# Patient Record
Sex: MALE | Race: ASIAN | NOT HISPANIC OR LATINO | Employment: FULL TIME | URBAN - METROPOLITAN AREA
[De-identification: names, ages, dates, MRNs, and addresses within clinical notes are randomized per-mention and may not be internally consistent; named-entity substitution may affect disease eponyms.]

---

## 2018-01-12 NOTE — MISCELLANEOUS
Provider Comments  Provider Comments:   pt was a no show to appt today        Signatures   Electronically signed by : Maeve Singh, ; Nov 21 2016  2:57PM EST                       (Author)

## 2018-01-23 NOTE — PROGRESS NOTES
Assessment   1  Encounter for preventive health examination (V70 0) (Z00 00)  2  Hyperlipidemia (272 4) (E78 5)  3  Prostate cancer screening encounter, options and risks discussed (V76 44) (Z12 5)    Plan  Hyperlipidemia    · (1) COMPREHENSIVE METABOLIC PANEL; Status:Active; Requested for:30Nov2016;    · (1) LIPID PANEL, FASTING; Status:Active; Requested for:30Nov2016;     Discussion/Summary  Impression: health maintenance visit  Currently, he eats a healthy diet and eats an adequate diet  Prostate cancer screening: the risks and benefits of prostate cancer screening were discussed and the patient declines PSA testing  Testicular cancer screening: testicular cancer screening is not indicated  Colorectal cancer screening: colorectal cancer screening is current and the next colonoscopy is due 2019  The immunizations are up to date and per patient report  Advice and education were given regarding aerobic exercise and weight bearing exercise  Patient discussion: discussed with the patient  Chief Complaint  60 yo here for HM exam      History of Present Illness  HM, Adult Male: The patient is being seen for a health maintenance evaluation  The last health maintenance visit was 1 year(s) ago  Social History: He is   Work status: working full time  The patient has never smoked cigarettes  He reports never drinking alcohol  He has never used illicit drugs  General Health: The patient's health since the last visit is described as good  Lifestyle:  He consumes a diverse and healthy diet  He does not have any weight concerns  He exercises regularly  exercise walking 3/week for 20 minutes  Screening: Colorectal cancer screening includes a colonoscopy within the past ten years  HPI: 61year old here for health maintenance physical, with h/0 hyperlipidemia  Today has no other complaints  Interested in PSA test d/t family friend with recent Prostate Cancer        Review of Systems    Constitutional: no fever, not feeling poorly, no recent weight gain, no chills, not feeling tired and no recent weight loss  Eyes: last eye exam 2016, wears glasses, but no eyesight problems and eyes not red  ENT: no earache, no sore throat, no hearing loss, no nasal discharge and no hoarseness  Cardiovascular: No complaints of slow heart rate, no fast heart rate, no chest pain, no palpitations, no leg claudication, no lower extremity  Respiratory: No complaints of shortness of breath, no wheezing, no cough, no SOB on exertion, no orthopnea or PND  Gastrointestinal: No complaints of abdominal pain, no constipation, no nausea or vomiting, no diarrhea or bloody stools  Genitourinary: no dysuria, no urinary hesitancy, no nocturia and no testicular pain  Musculoskeletal: No complaints of arthralgia, no myalgias, no joint swelling or stiffness, no limb pain or swelling  Integumentary: no rashes and no skin lesions  Neurological: no headache, no numbness, no tingling, no dizziness and no limb weakness  Psychiatric: no anxiety, no sleep disturbances, no depression and no emotional problems  Endocrine: no feelings of weakness  Hematologic/Lymphatic: no swollen glands  Active Problems   1  Foot Pain (Soft Tissue) (729 5)  2  Hyperlipidemia (272 4) (E78 5)  3  Tendinitis (726 90) (M77 9)    Family History  Mother    · Family history of Malignant Pancreatic Neoplasm  Father    · Family history of Chronic Liver Disease    Social History    · Former smoker (X92 19) (A69 798)   · Never Drank Alcohol    Allergies   1  No Known Drug Allergies    Vitals   Recorded: 14IHZ5060 10:42AM   Temperature 97 5 F, Tympanic   Heart Rate 72   Respiration 14   Systolic 653   Diastolic 70   BP CUFF SIZE Large   Height 5 ft 1 5 in   Weight 140 lb 4 oz   BMI Calculated 26 07   BSA Calculated 1 63   Pain Scale 0     Physical Exam    Constitutional   General appearance: No acute distress, well appearing and well nourished      Head and Face Head and face: Normal     Palpation of the face and sinuses: No sinus tenderness  Eyes   Conjunctiva and lids: No erythema, swelling or discharge  Pupils and irises: Equal, round, reactive to light  Fundoscopic grossly normal    Ears, Nose, Mouth, and Throat   External inspection of ears and nose: Normal     Otoscopic examination: Tympanic membranes translucent with normal light reflex  Canals patent without erythema  Hearing: Normal     Nasal mucosa, septum, and turbinates: Normal without edema or erythema  Lips, teeth, and gums: Normal, good dentition  Oropharynx: Normal with no erythema, edema, exudate or lesions  Neck   Neck: Supple, symmetric, trachea midline, no masses  Thyroid: Normal, no thyromegaly  Pulmonary   Respiratory effort: No increased work of breathing or signs of respiratory distress  Auscultation of lungs: Clear to auscultation  Cardiovascular   Palpation of heart: Normal PMI, no thrills  Auscultation of heart: Normal rate and rhythm, normal S1 and S2, no murmurs  Carotid pulses: 2+ bilaterally  Abdominal aorta: Normal     Femoral pulses: 2+ bilaterally  Pedal pulses: 2+ bilaterally  Peripheral vascular exam: Normal     Examination of extremities for edema and/or varicosities: Normal     Abdomen   Abdomen: Non-tender, no masses  Liver and spleen: No hepatomegaly or splenomegaly  Genitourinary declined JOANNE  Lymphatic   Palpation of lymph nodes in neck: No lymphadenopathy  Palpation of lymph nodes in axillae: No lymphadenopathy  Musculoskeletal   Gait and station: Normal     Inspection/palpation of digits and nails: Normal without clubbing or cyanosis  Inspection/palpation of joints, bones, and muscles: Normal     Range of motion: Normal     Stability: Normal     Muscle strength/tone: Normal     Skin   Skin and subcutaneous tissue: Normal without rashes or lesions  Neurologic   Cranial nerves: Cranial nerves 2-12 intact  Cortical function: Normal mental status  Reflexes: 2+ and symmetric  Sensation: No sensory loss  Coordination: Normal finger to nose and heel to shin  Psychiatric   Judgment and insight: Normal     Orientation to person, place and time: Normal     Recent and remote memory: Intact  Mood and affect: Normal        Attending Note  Attending Note: Attending Note:1  I discussed the case with the Resident and reviewed the Resident's note1 , I supervised the Resident1  and I agree with the Resident management plan as it was presented to [facility]    Level of Participation:1  I was present in clinic, but did not examine the patient1         1 Amended By: Jakub Lopez; Dec 01 2016 1:39 PM EST    Signatures   Electronically signed by : Kylah Wilson; Nov 30 2016  2:39PM EST                       (Author)    Electronically signed by : Damaris Clinton DO; Dec  1 2016  1:39PM EST                       (Author)

## 2019-07-02 ENCOUNTER — OFFICE VISIT (OUTPATIENT)
Dept: FAMILY MEDICINE CLINIC | Facility: CLINIC | Age: 59
End: 2019-07-02

## 2019-07-02 VITALS
HEART RATE: 74 BPM | SYSTOLIC BLOOD PRESSURE: 110 MMHG | WEIGHT: 137.4 LBS | TEMPERATURE: 99.2 F | BODY MASS INDEX: 26.97 KG/M2 | RESPIRATION RATE: 16 BRPM | DIASTOLIC BLOOD PRESSURE: 70 MMHG | HEIGHT: 60 IN

## 2019-07-02 DIAGNOSIS — Z13.6 SCREENING FOR CARDIOVASCULAR CONDITION: ICD-10-CM

## 2019-07-02 DIAGNOSIS — Z12.5 PROSTATE CANCER SCREENING: ICD-10-CM

## 2019-07-02 DIAGNOSIS — Z12.11 SCREENING FOR COLON CANCER: ICD-10-CM

## 2019-07-02 DIAGNOSIS — M25.561 CHRONIC PAIN OF RIGHT KNEE: ICD-10-CM

## 2019-07-02 DIAGNOSIS — G89.29 CHRONIC PAIN OF RIGHT KNEE: ICD-10-CM

## 2019-07-02 DIAGNOSIS — E78.00 HIGH CHOLESTEROL: ICD-10-CM

## 2019-07-02 DIAGNOSIS — Z00.00 HEALTHCARE MAINTENANCE: Primary | ICD-10-CM

## 2019-07-02 PROCEDURE — 99396 PREV VISIT EST AGE 40-64: CPT | Performed by: FAMILY MEDICINE

## 2019-07-02 NOTE — ASSESSMENT & PLAN NOTE
Generally healthy except for medical issues discussed elsewhere  rto in 1y for annual     Diet and exercise d/w pt

## 2019-07-02 NOTE — ASSESSMENT & PLAN NOTE
Asymptomatic  Shared decision making with pt  Pt opted in prostate screening with PSA level  Pt voided understanding risks and benefits of prostate screening

## 2019-07-02 NOTE — ASSESSMENT & PLAN NOTE
Most likely OA  However, pain failed with conservative treatment, will do plain XR knee to rule out stress fracture/pathology  otc analgesics prn, light exercise d/w pt  RTO in 1m

## 2019-07-02 NOTE — PROGRESS NOTES
Assessment/Plan:    Chronic pain of right knee  Most likely OA  However, pain failed with conservative treatment, will do plain XR knee to rule out stress fracture/pathology  otc analgesics prn, light exercise d/w pt  RTO in 1m  Healthcare maintenance  Generally healthy except for medical issues discussed elsewhere  rto in 1y for annual hm  Diet and exercise d/w pt    High cholesterol  H/o high cholesterol 3 years ago, no tx  Will recheck lipid panel, diet and exercise d/w pt  Prostate cancer screening  Asymptomatic  Shared decision making with pt  Pt opted in prostate screening with PSA level  Pt voided understanding risks and benefits of prostate screening  Screening for cardiovascular condition  Check lipid panel and cmp  Diet and exercise d/w pt  Screening for colon cancer  GI referral given today         Problem List Items Addressed This Visit        Other    High cholesterol - Primary     H/o high cholesterol 3 years ago, no tx  Will recheck lipid panel, diet and exercise d/w pt  Relevant Orders    Lipid panel    Comprehensive metabolic panel    Healthcare maintenance     Generally healthy except for medical issues discussed elsewhere  rto in 1y for annual hm  Diet and exercise d/w pt         Relevant Orders    Lipid panel    Comprehensive metabolic panel    Hepatitis C antibody    Screening for cardiovascular condition     Check lipid panel and cmp  Diet and exercise d/w pt  Relevant Orders    Lipid panel    Comprehensive metabolic panel    Chronic pain of right knee     Most likely OA  However, pain failed with conservative treatment, will do plain XR knee to rule out stress fracture/pathology  otc analgesics prn, light exercise d/w pt  RTO in 1m           Relevant Orders    XR knee 3 vw right non injury    Screening for colon cancer     GI referral given today         Relevant Orders    Ambulatory referral to Gastroenterology    Prostate cancer screening Asymptomatic  Shared decision making with pt  Pt opted in prostate screening with PSA level  Pt voided understanding risks and benefits of prostate screening  Relevant Orders    PSA, total and free          Subjective:      Patient ID: Elena Mendosa is a 60 y o  male  Here to establish as a new pt  Stated that he been doing great, except for Right knee pain for a long time, worsening lately  Pain with movement, climbing stairs, relieved with resting, no radiating  Denies weakness, paresthesia, prior trauma or injury  No constitutional symptoms  Denies cp, palpitation, sob, wheezes, abd pain, n/v/d, changes in bm or weight, IZQUIERDO  Former smoker, not a heavy smoker though, quitted 30+ years ago  Never drink alcohol  Had colonoscopy in the past, maybe 8-10 years ago, unsure result  Denies blood in stool or change in stool consitensy  The following portions of the patient's history were reviewed and updated as appropriate:     Review of Systems    As above    Objective:  /70 (BP Location: Left arm, Patient Position: Sitting, Cuff Size: Standard)   Pulse 74   Temp 99 2 °F (37 3 °C) (Tympanic)   Resp 16   Ht 5' (1 524 m)   Wt 62 3 kg (137 lb 6 4 oz)   BMI 26 83 kg/m²      Physical Exam   Constitutional: He is oriented to person, place, and time  He appears well-developed and well-nourished  No distress  HENT:   Head: Normocephalic and atraumatic  Right Ear: External ear normal    Left Ear: External ear normal    Nose: Nose normal    Mouth/Throat: Oropharynx is clear and moist  No oropharyngeal exudate  Eyes: Pupils are equal, round, and reactive to light  Conjunctivae and EOM are normal  Right eye exhibits no discharge  Left eye exhibits no discharge  No scleral icterus  Neck: Normal range of motion  Neck supple  No thyromegaly present  Cardiovascular: Normal rate and regular rhythm     Faint systolic murmur at erb's, non-radiating, 2/6   Pulmonary/Chest: Effort normal and breath sounds normal  No stridor  No respiratory distress  He has no wheezes  He has no rales  Abdominal: Soft  Bowel sounds are normal  He exhibits no distension and no mass  There is no tenderness  There is no guarding  Musculoskeletal: Normal range of motion  He exhibits no edema, tenderness or deformity  R knee: crepitus with movement, good stability, ROM WNL, no effusion or erythema  L knee and other joints wnl  Pulses strong   Lymphadenopathy:     He has no cervical adenopathy  Neurological: He is alert and oriented to person, place, and time  He displays normal reflexes  No cranial nerve deficit or sensory deficit  He exhibits normal muscle tone  Coordination normal    Skin: Skin is warm  Capillary refill takes less than 2 seconds  No rash noted  He is not diaphoretic  No erythema  No pallor  Psychiatric: He has a normal mood and affect  Nursing note and vitals reviewed

## 2019-07-08 LAB
ALBUMIN SERPL-MCNC: 4.4 G/DL (ref 3.6–5.1)
ALBUMIN/GLOB SERPL: 1.6 (CALC) (ref 1–2.5)
ALP SERPL-CCNC: 45 U/L (ref 40–115)
ALT SERPL-CCNC: 25 U/L (ref 9–46)
AST SERPL-CCNC: 31 U/L (ref 10–35)
BILIRUB SERPL-MCNC: 0.8 MG/DL (ref 0.2–1.2)
BUN SERPL-MCNC: 20 MG/DL (ref 7–25)
BUN/CREAT SERPL: ABNORMAL (CALC) (ref 6–22)
CALCIUM SERPL-MCNC: 9.4 MG/DL (ref 8.6–10.3)
CHLORIDE SERPL-SCNC: 106 MMOL/L (ref 98–110)
CHOLEST SERPL-MCNC: 223 MG/DL
CHOLEST/HDLC SERPL: 4.7 (CALC)
CO2 SERPL-SCNC: 25 MMOL/L (ref 20–32)
CREAT SERPL-MCNC: 1.11 MG/DL (ref 0.7–1.25)
GLOBULIN SER CALC-MCNC: 2.7 G/DL (CALC) (ref 1.9–3.7)
GLUCOSE SERPL-MCNC: 101 MG/DL (ref 65–99)
HCV AB S/CO SERPL IA: 0.01
HCV AB SERPL QL IA: NORMAL
HDLC SERPL-MCNC: 47 MG/DL
LDLC SERPL CALC-MCNC: 144 MG/DL (CALC)
NONHDLC SERPL-MCNC: 176 MG/DL (CALC)
POTASSIUM SERPL-SCNC: 3.8 MMOL/L (ref 3.5–5.3)
PROT SERPL-MCNC: 7.1 G/DL (ref 6.1–8.1)
PSA FREE MFR SERPL: 22 % (CALC)
PSA FREE SERPL-MCNC: 0.4 NG/ML
PSA SERPL-MCNC: 1.8 NG/ML
SL AMB EGFR AFRICAN AMERICAN: 83 ML/MIN/1.73M2
SL AMB EGFR NON AFRICAN AMERICAN: 72 ML/MIN/1.73M2
SODIUM SERPL-SCNC: 139 MMOL/L (ref 135–146)
TRIGL SERPL-MCNC: 183 MG/DL

## 2019-07-10 DIAGNOSIS — E78.00 HIGH CHOLESTEROL: Primary | ICD-10-CM

## 2019-07-10 RX ORDER — ATORVASTATIN CALCIUM 20 MG/1
20 TABLET, FILM COATED ORAL DAILY
Qty: 180 TABLET | Refills: 0 | Status: SHIPPED | OUTPATIENT
Start: 2019-07-10 | End: 2020-07-30 | Stop reason: SDUPTHER

## 2019-07-16 ENCOUNTER — TELEPHONE (OUTPATIENT)
Dept: FAMILY MEDICINE CLINIC | Facility: CLINIC | Age: 59
End: 2019-07-16

## 2019-07-16 NOTE — TELEPHONE ENCOUNTER
Left message in regard to a reminder for patient to get his knee xray done  Ordered by Dr Daryn Alvarez on 07/02

## 2019-07-21 ENCOUNTER — APPOINTMENT (OUTPATIENT)
Dept: RADIOLOGY | Age: 59
End: 2019-07-21
Payer: COMMERCIAL

## 2019-07-21 DIAGNOSIS — G89.29 CHRONIC PAIN OF RIGHT KNEE: ICD-10-CM

## 2019-07-21 DIAGNOSIS — M25.561 CHRONIC PAIN OF RIGHT KNEE: ICD-10-CM

## 2019-07-21 PROCEDURE — 73562 X-RAY EXAM OF KNEE 3: CPT

## 2019-08-13 ENCOUNTER — TELEPHONE (OUTPATIENT)
Dept: GASTROENTEROLOGY | Facility: MEDICAL CENTER | Age: 59
End: 2019-08-13

## 2019-08-13 NOTE — TELEPHONE ENCOUNTER
Pt called to schedule oa colon, I offered a few dates in sept he will call back and let me know which work best for him

## 2020-07-07 ENCOUNTER — TELEPHONE (OUTPATIENT)
Dept: FAMILY MEDICINE CLINIC | Facility: CLINIC | Age: 60
End: 2020-07-07

## 2020-07-30 ENCOUNTER — OFFICE VISIT (OUTPATIENT)
Dept: FAMILY MEDICINE CLINIC | Facility: CLINIC | Age: 60
End: 2020-07-30

## 2020-07-30 VITALS
HEART RATE: 76 BPM | BODY MASS INDEX: 26.58 KG/M2 | RESPIRATION RATE: 16 BRPM | HEIGHT: 60 IN | WEIGHT: 135.4 LBS | DIASTOLIC BLOOD PRESSURE: 80 MMHG | SYSTOLIC BLOOD PRESSURE: 110 MMHG | TEMPERATURE: 97.3 F

## 2020-07-30 DIAGNOSIS — M25.561 CHRONIC PAIN OF RIGHT KNEE: ICD-10-CM

## 2020-07-30 DIAGNOSIS — Z00.00 HEALTHCARE MAINTENANCE: ICD-10-CM

## 2020-07-30 DIAGNOSIS — Z00.00 ANNUAL PHYSICAL EXAM: Primary | ICD-10-CM

## 2020-07-30 DIAGNOSIS — Z12.11 SCREENING FOR COLON CANCER: ICD-10-CM

## 2020-07-30 DIAGNOSIS — G89.29 CHRONIC PAIN OF RIGHT KNEE: ICD-10-CM

## 2020-07-30 DIAGNOSIS — Z11.4 SCREENING FOR HIV (HUMAN IMMUNODEFICIENCY VIRUS): ICD-10-CM

## 2020-07-30 DIAGNOSIS — E78.00 HIGH CHOLESTEROL: ICD-10-CM

## 2020-07-30 DIAGNOSIS — Z13.6 SCREENING FOR CARDIOVASCULAR CONDITION: ICD-10-CM

## 2020-07-30 PROCEDURE — 99396 PREV VISIT EST AGE 40-64: CPT | Performed by: FAMILY MEDICINE

## 2020-07-30 PROCEDURE — 3008F BODY MASS INDEX DOCD: CPT | Performed by: FAMILY MEDICINE

## 2020-07-30 RX ORDER — ATORVASTATIN CALCIUM 20 MG/1
20 TABLET, FILM COATED ORAL DAILY
Qty: 180 TABLET | Refills: 0 | Status: SHIPPED | OUTPATIENT
Start: 2020-07-30 | End: 2021-05-14 | Stop reason: SDUPTHER

## 2020-07-30 NOTE — PATIENT INSTRUCTIONS

## 2020-07-30 NOTE — ASSESSMENT & PLAN NOTE
Patient presenting for annual physical exam, no acute concerns    Discussed lifestyle modification including to from way of lowering the LDL  Discussed statin medication, patient would like refill, encourage continuation of this medication

## 2020-07-30 NOTE — ASSESSMENT & PLAN NOTE
Patient has elevated total cholesterol, triglyceride and LDL on the last lipid panel back in 2019, will repeat lipid panel at this time  ASCVD discussed with patient, 8 3%  Will continue atorvastatin 20 mg at this time (patient is prescribed 180 tablet back in July of last year, has not had refill since then)

## 2020-07-30 NOTE — ASSESSMENT & PLAN NOTE
Patient denies any significant impingement to gait, no significant pain with bilateral knee  Will continue to observe  Continue walking and gentle exercise to help strengthen surrounding muscle  Encourage patient to participate in slow movement activity such as yoga and Nir Chi

## 2020-07-30 NOTE — PROGRESS NOTES
ADULT ANNUAL [address] [place]    NAME: Lorraine Steward  AGE: 63 y o  SEX: male  : 1960     DATE: 2020     Assessment and Plan:     Problem List Items Addressed This Visit        Other    High cholesterol     Patient has elevated total cholesterol, triglyceride and LDL on the last lipid panel back in , will repeat lipid panel at this time  ASCVD discussed with patient, 8 3%  Will continue atorvastatin 20 mg at this time (patient is prescribed 180 tablet back in July of last year, has not had refill since then)         Relevant Medications    atorvastatin (LIPITOR) 20 mg tablet    Healthcare maintenance     Patient presenting for annual physical exam, no acute concerns    Discussed lifestyle modification including to from way of lowering the LDL  Discussed statin medication, patient would like refill, encourage continuation of this medication         Screening for cardiovascular condition     Will repeat lipid panel, CMP (statin use), HbA1C for evaluation for hypercholesterolemia         Relevant Orders    Lipid Panel with Direct LDL reflex    HEMOGLOBIN A1C W/ EAG ESTIMATION    Comprehensive metabolic panel    Chronic pain of right knee     Patient denies any significant impingement to gait, no significant pain with bilateral knee  Will continue to observe  Continue walking and gentle exercise to help strengthen surrounding muscle  Encourage patient to participate in slow movement activity such as yoga and Nir Chi         Screening for colon cancer     Refer to ambulatory GI for colonoscopy per screening         Relevant Orders    Ambulatory referral to Gastroenterology      Other Visit Diagnoses     Annual physical exam    -  Primary    Screening for HIV (human immunodeficiency virus)        Relevant Orders    HIV 1/2 Antigen/Antibody (4th Generation) w Reflex [facility]          Depression Screening Follow-up Plan: Patient's depression screening was positive with a PHQ-2 score of 0  Clinically patient does not have depression  No treatment is required  BMI Counseling: Body mass index is 26 44 kg/m²  The BMI is above normal  Nutrition recommendations include 3-5 servings of fruits/vegetables daily, moderation in carbohydrate intake, increasing intake of lean protein, reducing intake of saturated fat and trans fat and reducing intake of cholesterol  Exercise recommendations include moderate aerobic physical activity for 150 minutes/week and exercising 3-5 times per week  Immunizations and preventive care screenings were discussed with patient today  Appropriate education was printed on patient's after visit summary  Counseling:  Alcohol/drug use: discussed moderation in alcohol intake, the recommendations for healthy alcohol use, and avoidance of illicit drug use  Dental Health: discussed importance of regular tooth brushing, flossing, and dental visits  Injury prevention: discussed safety/seat belts, safety helmets, smoke detectors, carbon dioxide detectors, and smoking near bedding or upholstery  Sexual health: discussed sexually transmitted diseases, partner selection, use of condoms, avoidance of unintended pregnancy, and contraceptive alternatives  · Exercise: the importance of regular exercise/physical activity was discussed  Recommend exercise 3-5 times per week for at least 30 minutes  No follow-ups on file  Chief Complaint:     Chief Complaint   Patient presents with    Physical Exam      History of Present Illness:     Adult Annual Physical   Patient here for a comprehensive physical exam  The patient reports no problems  Patient's last seen in July of 2019 by Dr Danuta Portillo for evaluation of chronic medical condition including, high cholesterol, prostate cancer screening, cardiovascular cancer screening  Patient has not completed colonoscopy in 2019, agreeable to complete this year      Diet and Physical Activity  · Diet/Nutrition: well balanced diet  · Exercise: walking  Depression Screening  PHQ-9 Depression Screening    PHQ-9:    Frequency of the following problems over the past two weeks:       Little interest or pleasure in doing things:  0 - not at all  Feeling down, depressed, or hopeless:  0 - not at all  PHQ-2 Score:  0       General Health  · Sleep: gets 4-6 hours of sleep on average and snores loudly  No daytime sleepiness  · Hearing: normal - bilateral   · Vision: no vision problems, most recent eye exam <1 year ago and wears glasses  · Dental: regular dental visits and brushes teeth twice daily   Health  · Symptoms include: erectile dysfunction, urethral discharge, urinary frequency and urinary urgency      Review of Systems:     Review of Systems   Constitutional: Negative for chills and fever  HENT: Negative for congestion, rhinorrhea and sore throat  Respiratory: Negative for cough and shortness of breath  Cardiovascular: Negative for chest pain  Gastrointestinal: Negative for abdominal pain, blood in stool, constipation, diarrhea, nausea and vomiting  Genitourinary: Negative for dysuria, hematuria and penile pain  Musculoskeletal: Positive for back pain  Negative for gait problem  Stable back pain when getting up in the morning  Patient sleeps on his back     Skin: Negative for rash  Allergic/Immunologic: Positive for environmental allergies  Negative for food allergies  Neurological: Negative for dizziness, light-headedness and headaches  Psychiatric/Behavioral: Negative for sleep disturbance  Past Medical History:     History reviewed  No pertinent past medical history     Past Surgical History:     Past Surgical History:   Procedure Laterality Date    FOOT SURGERY        Family History:     Family History   Problem Relation Age of Onset    Pancreatic cancer Mother     Liver disease Father         Chronic       Social History: E-Cigarette/Vaping    E-Cigarette Use Never User      E-Cigarette/Vaping Substances    Nicotine No     THC No     CBD No     Flavoring No     Other No     Unknown No      Social History     Socioeconomic History    Marital status: /Civil Union     Spouse name: None    Number of children: None    Years of education: None    Highest education level: None   Occupational History    None   Social Needs    Financial resource strain: Not hard at all   Friona-Ronaldo insecurity:     Worry: Never true     Inability: Never true    Transportation needs:     Medical: No     Non-medical: No   Tobacco Use    Smoking status: Former Smoker    Smokeless tobacco: Never Used   Substance and Sexual Activity    Alcohol use: Never     Frequency: Never    Drug use: Never    Sexual activity: Not Currently     Partners: Female   Lifestyle    Physical activity:     Days per week: None     Minutes per session: None    Stress: None   Relationships    Social connections:     Talks on phone: None     Gets together: None     Attends Synagogue service: None     Active member of club or organization: None     Attends meetings of clubs or organizations: None     Relationship status: None    Intimate partner violence:     Fear of current or ex partner: None     Emotionally abused: None     Physically abused: None     Forced sexual activity: None   Other Topics Concern    None   Social History Narrative    None      Current Medications:     Current Outpatient Medications   Medication Sig Dispense Refill    atorvastatin (LIPITOR) 20 mg tablet Take 1 tablet (20 mg total) by mouth daily 180 tablet 0     No current facility-administered medications for this visit         Allergies:     No Known Allergies   Physical Exam:     /80 (BP Location: Left arm, Patient Position: Sitting, Cuff Size: Standard)   Pulse 76   Temp (!) 97 3 °F (36 3 °C) (Tympanic)   Resp 16   Ht 5' (1 524 m)   Wt 61 4 kg (135 lb 6 4 oz)   BMI 26 44 kg/m²     Physical Exam   Constitutional: He is oriented to person, place, and time  He appears well-developed and well-nourished  No distress  HENT:   Head: Normocephalic  Nose: Nose normal    Mouth/Throat: No oropharyngeal exudate  Eyes: Right eye exhibits no discharge  Left eye exhibits no discharge  Neck: Neck supple  No thyromegaly present  Cardiovascular: Normal rate and regular rhythm  No murmur heard  Pulmonary/Chest: Effort normal and breath sounds normal  No respiratory distress  Abdominal: Soft  Bowel sounds are normal  He exhibits no distension  Musculoskeletal: Normal range of motion  He exhibits no edema, tenderness or deformity  Lymphadenopathy:     He has no cervical adenopathy  Neurological: He is alert and oriented to person, place, and time  No cranial nerve deficit  Skin: Skin is warm and dry  Capillary refill takes less than 2 seconds  No rash noted  He is not diaphoretic  No erythema  No pallor  Psychiatric: He has a normal mood and affect  Vitals reviewed        Willy Santoro MD  [address] [place] [address]

## 2020-08-03 LAB
ALBUMIN SERPL-MCNC: 4.3 G/DL (ref 3.6–5.1)
ALBUMIN/GLOB SERPL: 1.7 (CALC) (ref 1–2.5)
ALP SERPL-CCNC: 53 U/L (ref 35–144)
ALT SERPL-CCNC: 23 U/L (ref 9–46)
AST SERPL-CCNC: 21 U/L (ref 10–35)
BILIRUB SERPL-MCNC: 0.5 MG/DL (ref 0.2–1.2)
BUN SERPL-MCNC: 19 MG/DL (ref 7–25)
BUN/CREAT SERPL: ABNORMAL (CALC) (ref 6–22)
CALCIUM SERPL-MCNC: 9.3 MG/DL (ref 8.6–10.3)
CHLORIDE SERPL-SCNC: 103 MMOL/L (ref 98–110)
CHOLEST SERPL-MCNC: 238 MG/DL
CHOLEST/HDLC SERPL: 5.1 (CALC)
CO2 SERPL-SCNC: 27 MMOL/L (ref 20–32)
CREAT SERPL-MCNC: 1.06 MG/DL (ref 0.7–1.25)
EST. AVERAGE GLUCOSE BLD GHB EST-MCNC: 114 (CALC)
EST. AVERAGE GLUCOSE BLD GHB EST-SCNC: 6.3 (CALC)
GLOBULIN SER CALC-MCNC: 2.6 G/DL (CALC) (ref 1.9–3.7)
GLUCOSE SERPL-MCNC: 107 MG/DL (ref 65–99)
HBA1C MFR BLD: 5.6 % OF TOTAL HGB
HDLC SERPL-MCNC: 47 MG/DL
HIV 1+2 AB+HIV1 P24 AG SERPL QL IA: NORMAL
LDLC SERPL CALC-MCNC: 165 MG/DL (CALC)
NONHDLC SERPL-MCNC: 191 MG/DL (CALC)
POTASSIUM SERPL-SCNC: 4.3 MMOL/L (ref 3.5–5.3)
PROT SERPL-MCNC: 6.9 G/DL (ref 6.1–8.1)
SL AMB EGFR AFRICAN AMERICAN: 87 ML/MIN/1.73M2
SL AMB EGFR NON AFRICAN AMERICAN: 75 ML/MIN/1.73M2
SODIUM SERPL-SCNC: 137 MMOL/L (ref 135–146)
TRIGL SERPL-MCNC: 126 MG/DL

## 2021-04-22 ENCOUNTER — IMMUNIZATIONS (OUTPATIENT)
Dept: FAMILY MEDICINE CLINIC | Facility: HOSPITAL | Age: 61
End: 2021-04-22

## 2021-04-22 DIAGNOSIS — Z23 ENCOUNTER FOR IMMUNIZATION: Primary | ICD-10-CM

## 2021-04-22 PROCEDURE — 0001A SARS-COV-2 / COVID-19 MRNA VACCINE (PFIZER-BIONTECH) 30 MCG: CPT

## 2021-04-22 PROCEDURE — 91300 SARS-COV-2 / COVID-19 MRNA VACCINE (PFIZER-BIONTECH) 30 MCG: CPT

## 2021-04-28 ENCOUNTER — RA CDI HCC (OUTPATIENT)
Dept: OTHER | Facility: HOSPITAL | Age: 61
End: 2021-04-28

## 2021-04-28 NOTE — PROGRESS NOTES
[facility]  coding opportunities          Chart reviewed, no opportunity found: CHART REVIEWED, NO OPPORTUNITY FOUND              Patients insurance company: [employer] (Medicare Advantage and Commercial)

## 2021-05-14 DIAGNOSIS — E78.00 HIGH CHOLESTEROL: ICD-10-CM

## 2021-05-14 RX ORDER — ATORVASTATIN CALCIUM 20 MG/1
20 TABLET, FILM COATED ORAL DAILY
Qty: 90 TABLET | Refills: 1 | Status: SHIPPED | OUTPATIENT
Start: 2021-05-14 | End: 2022-01-10 | Stop reason: SDUPTHER

## 2021-05-20 ENCOUNTER — IMMUNIZATIONS (OUTPATIENT)
Dept: FAMILY MEDICINE CLINIC | Facility: HOSPITAL | Age: 61
End: 2021-05-20

## 2021-05-20 DIAGNOSIS — Z23 ENCOUNTER FOR IMMUNIZATION: Primary | ICD-10-CM

## 2021-05-20 PROCEDURE — 0002A SARS-COV-2 / COVID-19 MRNA VACCINE (PFIZER-BIONTECH) 30 MCG: CPT

## 2021-05-20 PROCEDURE — 91300 SARS-COV-2 / COVID-19 MRNA VACCINE (PFIZER-BIONTECH) 30 MCG: CPT

## 2021-06-01 ENCOUNTER — OFFICE VISIT (OUTPATIENT)
Dept: FAMILY MEDICINE CLINIC | Facility: CLINIC | Age: 61
End: 2021-06-01

## 2021-06-01 VITALS
RESPIRATION RATE: 18 BRPM | TEMPERATURE: 98.6 F | DIASTOLIC BLOOD PRESSURE: 82 MMHG | OXYGEN SATURATION: 97 % | HEART RATE: 82 BPM | BODY MASS INDEX: 26.74 KG/M2 | SYSTOLIC BLOOD PRESSURE: 120 MMHG | HEIGHT: 60 IN | WEIGHT: 136.2 LBS

## 2021-06-01 DIAGNOSIS — E78.00 HIGH CHOLESTEROL: ICD-10-CM

## 2021-06-01 DIAGNOSIS — Z00.00 ANNUAL PHYSICAL EXAM: Primary | ICD-10-CM

## 2021-06-01 PROCEDURE — 3008F BODY MASS INDEX DOCD: CPT | Performed by: FAMILY MEDICINE

## 2021-06-01 PROCEDURE — 99396 PREV VISIT EST AGE 40-64: CPT | Performed by: FAMILY MEDICINE

## 2021-06-01 PROCEDURE — 1036F TOBACCO NON-USER: CPT | Performed by: FAMILY MEDICINE

## 2021-06-01 PROCEDURE — 3725F SCREEN DEPRESSION PERFORMED: CPT | Performed by: FAMILY MEDICINE

## 2021-06-01 NOTE — PATIENT INSTRUCTIONS

## 2021-06-01 NOTE — PROGRESS NOTES
[address] [facility] [place]    NAME: Blanca Bustos  AGE: 57 y o  SEX: male  : 1960     DATE: 2021     Assessment and Plan:     Problem List Items Addressed This Visit        Other    High cholesterol    Relevant Orders    Comprehensive metabolic panel    Lipid Panel with Direct LDL reflex    HEMOGLOBIN A1C W/ EAG ESTIMATION      Other Visit Diagnoses     Annual physical exam    -  Primary      Annual physical exam completed today, no additional findings  Continue atorvastatin 20mg daily at this time  Repeat blood work including lipid panel, CMP, Hba1c in August   If cholesterol continues to be elevated, consider increasing atorvastatin to 40 mg daily  Advised patient to wear sunscreen when spending more than 15 minutes outside    Patient interested in permanent sterilization, well have him follow in 4 weeks and discuss together with lab results    Immunizations and preventive care screenings were discussed with patient today  Appropriate education was printed on patient's after visit summary  Counseling:  Alcohol/drug use: discussed moderation in alcohol intake, the recommendations for healthy alcohol use, and avoidance of illicit drug use  Dental Health: discussed importance of regular tooth brushing, flossing, and dental visits  Injury prevention: discussed safety/seat belts, safety helmets, smoke detectors, carbon dioxide detectors, and smoking near bedding or upholstery  Sexual health: discussed sexually transmitted diseases, partner selection, use of condoms, avoidance of unintended pregnancy, and contraceptive alternatives  · Exercise: the importance of regular exercise/physical activity was discussed  Recommend exercise 3-5 times per week for at least 30 minutes  Return in 4 weeks (on 2021)       Chief Complaint:     Chief Complaint   Patient presents with    Physical Exam      History of Present Illness:     Adult Annual Physical   Patient here for a comprehensive physical exam  The patient reports no concerns  Patient has been adherent with his medication, atorvastatin 20 mg daily  Previous blood work completed in August of 2020  Patient completed colonoscopy in 11/02/2022, found a single polyp, excised, patient to return in 3 year for repeat colonoscopy  Patient has received 2 doses of COVID-19 Pfizer vaccine      Diet and Physical Activity  · Diet/Nutrition: well balanced diet and consuming 3-5 servings of fruits/vegetables daily  · Exercise: walking, 3 times a week      BMI Counseling: Body mass index is 26 6 kg/m²  The BMI is above normal  Nutrition recommendations include reducing portion sizes, 3-5 servings of fruits/vegetables daily, consuming healthier snacks, increasing intake of lean protein, reducing intake of saturated fat and trans fat and reducing intake of cholesterol  Exercise recommendations include exercising 3-5 times per week  Depression Screening  PHQ-9 Depression Screening    PHQ-9:   Frequency of the following problems over the past two weeks:      Little interest or pleasure in doing things: 0 - not at all  Feeling down, depressed, or hopeless: 0 - not at all  PHQ-2 Score: 0       General Health  · Sleep: sleeps well, snores loudly and Denies any gasping or apneic episode, does not have any daytime drowsiness  · Hearing: normal - bilateral    · Vision: wears glasses  · Dental: regular dental visits and brushes teeth twice daily   Health  · Symptoms include: none     Review of Systems:     Review of Systems   Constitutional: Negative for chills, fever and unexpected weight change  HENT: Negative for congestion, postnasal drip and sore throat  Respiratory: Negative for cough and shortness of breath  Cardiovascular: Negative for chest pain     Gastrointestinal: Negative for abdominal pain, blood in stool, constipation, diarrhea, nausea and vomiting  Genitourinary: Negative for difficulty urinating, dysuria and hematuria  Musculoskeletal: Negative for arthralgias and gait problem  Occasional pain on the knee, more on left greater than right   Skin:        Erythema from recent sun exposure   Allergic/Immunologic: Negative for environmental allergies and food allergies  Seasonal allergy   Neurological: Negative for dizziness, light-headedness and headaches  Psychiatric/Behavioral: Negative for self-injury, sleep disturbance and suicidal ideas  PHQ-9 Depression Screening    PHQ-9:   Frequency of the following problems over the past two weeks:      Little interest or pleasure in doing things: 0 - not at all  Feeling down, depressed, or hopeless: 0 - not at all  PHQ-2 Score: 0            Past Medical History:     History reviewed  No pertinent past medical history     Past Surgical History:     Past Surgical History:   Procedure Laterality Date    FOOT SURGERY        Family History:     Family History   Problem Relation Age of Onset    Pancreatic cancer Mother     Liver disease Father         Chronic       Social History:     E-Cigarette/Vaping    E-Cigarette Use Never User      E-Cigarette/Vaping Substances    Nicotine No     THC No     CBD No     Flavoring No     Other No     Unknown No      Social History     Socioeconomic History    Marital status: /Civil Union     Spouse name: None    Number of children: None    Years of education: None    Highest education level: None   Occupational History    None   Social Needs    Financial resource strain: Not hard at all   Shyala-Ronaldo insecurity     Worry: Never true     Inability: Never true    Transportation needs     Medical: No     Non-medical: No   Tobacco Use    Smoking status: Former Smoker    Smokeless tobacco: Never Used   Substance and Sexual Activity    Alcohol use: Never     Frequency: Never    Drug use: Never    Sexual activity: Not Currently Partners: Female   Lifestyle    Physical activity     Days per week: None     Minutes per session: None    Stress: None   Relationships    Social connections     Talks on phone: None     Gets together: None     Attends [employer] witness service: None     Active member of club or organization: None     Attends meetings of clubs or organizations: None     Relationship status: None    Intimate partner violence     Fear of current or ex partner: None     Emotionally abused: None     Physically abused: None     Forced sexual activity: None   Other Topics Concern    None   Social History Narrative    None      Current Medications:     Current Outpatient Medications   Medication Sig Dispense Refill    atorvastatin (LIPITOR) 20 mg tablet Take 1 tablet (20 mg total) by mouth daily 90 tablet 1     No current facility-administered medications for this visit  Allergies:     No Known Allergies   Physical Exam:     /82 (BP Location: Left arm, Patient Position: Sitting, Cuff Size: Standard)   Pulse 82   Temp 98 6 °F (37 °C) (Temporal)   Resp 18   Ht 5' (1 524 m)   Wt 61 8 kg (136 lb 3 2 oz)   SpO2 97%   BMI 26 60 kg/m²     Physical Exam  Vitals signs reviewed  Constitutional:       General: He is not in acute distress  Appearance: Normal appearance  He is not ill-appearing, toxic-appearing or diaphoretic  HENT:      Head: Normocephalic and atraumatic  Nose: Nose normal    Eyes:      General:         Right eye: No discharge  Left eye: No discharge  Extraocular Movements: Extraocular movements intact  Conjunctiva/sclera: Conjunctivae normal       Pupils: Pupils are equal, round, and reactive to light  Comments: Wears glasses   Neck:      Musculoskeletal: Normal range of motion and neck supple  No neck rigidity  Cardiovascular:      Rate and Rhythm: Normal rate and regular rhythm  Pulses: Normal pulses  Heart sounds: Normal heart sounds     Pulmonary:      Effort: Pulmonary effort is normal  No respiratory distress  Breath sounds: Normal breath sounds  Abdominal:      General: Abdomen is flat  Bowel sounds are normal  There is no distension  Palpations: Abdomen is soft  Musculoskeletal: Normal range of motion  General: No swelling  Skin:     General: Skin is warm and dry  Capillary Refill: Capillary refill takes less than 2 seconds  Coloration: Skin is not jaundiced  Findings: Erythema present  Comments: Mild Erythema on the extensor surface of exposed skin from sun exposure   Neurological:      General: No focal deficit present  Mental Status: He is alert and oriented to person, place, and time     Psychiatric:         Mood and Affect: Mood normal           Sabino Doty MD  [address]

## 2021-06-06 LAB
ALBUMIN SERPL-MCNC: 4.4 G/DL (ref 3.6–5.1)
ALBUMIN/GLOB SERPL: 1.8 (CALC) (ref 1–2.5)
ALP SERPL-CCNC: 60 U/L (ref 35–144)
ALT SERPL-CCNC: 29 U/L (ref 9–46)
AST SERPL-CCNC: 21 U/L (ref 10–35)
BILIRUB SERPL-MCNC: 0.7 MG/DL (ref 0.2–1.2)
BUN SERPL-MCNC: 18 MG/DL (ref 7–25)
BUN/CREAT SERPL: ABNORMAL (CALC) (ref 6–22)
CALCIUM SERPL-MCNC: 9.1 MG/DL (ref 8.6–10.3)
CHLORIDE SERPL-SCNC: 104 MMOL/L (ref 98–110)
CHOLEST SERPL-MCNC: 170 MG/DL
CHOLEST/HDLC SERPL: 3.4 (CALC)
CO2 SERPL-SCNC: 28 MMOL/L (ref 20–32)
CREAT SERPL-MCNC: 0.99 MG/DL (ref 0.7–1.25)
EST. AVERAGE GLUCOSE BLD GHB EST-MCNC: 111 (CALC)
EST. AVERAGE GLUCOSE BLD GHB EST-SCNC: 6.2 (CALC)
GLOBULIN SER CALC-MCNC: 2.5 G/DL (CALC) (ref 1.9–3.7)
GLUCOSE SERPL-MCNC: 103 MG/DL (ref 65–99)
HBA1C MFR BLD: 5.5 % OF TOTAL HGB
HDLC SERPL-MCNC: 50 MG/DL
LDLC SERPL CALC-MCNC: 93 MG/DL (CALC)
NONHDLC SERPL-MCNC: 120 MG/DL (CALC)
POTASSIUM SERPL-SCNC: 4.3 MMOL/L (ref 3.5–5.3)
PROT SERPL-MCNC: 6.9 G/DL (ref 6.1–8.1)
SL AMB EGFR AFRICAN AMERICAN: 94 ML/MIN/1.73M2
SL AMB EGFR NON AFRICAN AMERICAN: 81 ML/MIN/1.73M2
SODIUM SERPL-SCNC: 140 MMOL/L (ref 135–146)
TRIGL SERPL-MCNC: 169 MG/DL

## 2021-06-29 ENCOUNTER — OFFICE VISIT (OUTPATIENT)
Dept: FAMILY MEDICINE CLINIC | Facility: CLINIC | Age: 61
End: 2021-06-29

## 2021-06-29 VITALS
OXYGEN SATURATION: 98 % | WEIGHT: 136.4 LBS | SYSTOLIC BLOOD PRESSURE: 120 MMHG | BODY MASS INDEX: 26.78 KG/M2 | HEIGHT: 60 IN | HEART RATE: 73 BPM | DIASTOLIC BLOOD PRESSURE: 82 MMHG | RESPIRATION RATE: 16 BRPM | TEMPERATURE: 97.4 F

## 2021-06-29 DIAGNOSIS — Z30.09 STERILIZATION CONSULT: Primary | ICD-10-CM

## 2021-06-29 PROCEDURE — 99213 OFFICE O/P EST LOW 20 MIN: CPT | Performed by: FAMILY MEDICINE

## 2021-06-29 PROCEDURE — 3008F BODY MASS INDEX DOCD: CPT | Performed by: FAMILY MEDICINE

## 2021-06-29 PROCEDURE — 1036F TOBACCO NON-USER: CPT | Performed by: FAMILY MEDICINE

## 2021-06-29 NOTE — PROGRESS NOTES
Assessment/Plan:    Sterilization consult  This is a patient that his wife is almost at age where she is no longer evident have child  Also, IUDs very effective method of contraception  Together with condom use, this may reach up to 99% affective rate  Encourage patient to discuss with his wife whether not he want to proceed with vasectomy  Provided patient with refer to urology so he can have discussion with specialists in regards to risk and benefit of the procedure      Subjective:      Patient ID: Eryn Langley is a 57 y o  male  HPI    40-year-old male patient presents for follow-up to discuss lab results and did discuss vasectomy  Patient's lab results including hemoglobin A1c, CMP, lipid panel was reviewed and the results of the significance was discussed with patient  Patient is 40-year-old, monogamous was 3 female partner, asking about vasectomy for permanent sterilization  Denies any other partners  Patient's wife currently uses IUD for contraception  Review of Systems   Constitutional: Negative for chills and fever  HENT: Negative for congestion, rhinorrhea and sore throat  Respiratory: Negative for shortness of breath  Cardiovascular: Negative for chest pain  Gastrointestinal: Negative for abdominal pain, blood in stool, constipation, diarrhea, nausea and vomiting  Neurological: Negative for dizziness, light-headedness and headaches  Objective:    /82 (BP Location: Left arm, Patient Position: Sitting, Cuff Size: Standard)   Pulse 73   Temp (!) 97 4 °F (36 3 °C) (Temporal)   Resp 16   Ht 5' (1 524 m)   Wt 61 9 kg (136 lb 6 4 oz)   SpO2 98%   BMI 26 64 kg/m²       Physical Exam  Vitals reviewed  Constitutional:       Appearance: Normal appearance  HENT:      Head: Normocephalic  Cardiovascular:      Rate and Rhythm: Normal rate and regular rhythm  Pulses: Normal pulses  Heart sounds: Normal heart sounds     Pulmonary:      Effort: Pulmonary effort is normal       Breath sounds: Normal breath sounds  Abdominal:      General: Abdomen is flat  Skin:     General: Skin is warm and dry  Capillary Refill: Capillary refill takes less than 2 seconds  Coloration: Skin is not jaundiced  Neurological:      General: No focal deficit present  Mental Status: He is alert  Psychiatric:         Mood and Affect: Mood normal           JUANJOSE Maza  Family Medicine, PGY-3    Please excuse any "sound-alike" errors that may have ocurred during the process of dictation  Parts of this note have been dictated and there may be errors present in the transcription process  Thank you

## 2021-06-29 NOTE — ASSESSMENT & PLAN NOTE
This is a patient that his wife is almost at age where she is no longer evident have child  Also, IUDs very effective method of contraception  Together with condom use, this may reach up to 99% affective rate  Encourage patient to discuss with his wife whether not he want to proceed with vasectomy  Provided patient with refer to urology so he can have discussion with specialists in regards to risk and benefit of the procedure

## 2021-06-30 ENCOUNTER — TELEPHONE (OUTPATIENT)
Dept: UROLOGY | Facility: HOSPITAL | Age: 61
End: 2021-06-30

## 2021-06-30 NOTE — TELEPHONE ENCOUNTER
Working Referral [address] [place] [address],[address], 1st attempt to contact magalie elmore to call the office to schedule an appointment  New patient being referred to Urology from [place], [place]  for Z30 09 (ICD-10-CM) - Sterilization consult  No appt spot held for this patient

## 2022-01-03 DIAGNOSIS — E78.00 HIGH CHOLESTEROL: ICD-10-CM

## 2022-01-03 RX ORDER — ATORVASTATIN CALCIUM 20 MG/1
20 TABLET, FILM COATED ORAL DAILY
Qty: 90 TABLET | Refills: 1 | OUTPATIENT
Start: 2022-01-03 | End: 2022-07-02

## 2022-01-05 NOTE — TELEPHONE ENCOUNTER
Left message to schedule appt to get refill   Also changed his pharm to [facility] per patient's request

## 2022-01-08 ENCOUNTER — APPOINTMENT (OUTPATIENT)
Dept: RADIOLOGY | Age: 62
End: 2022-01-08
Attending: PHYSICIAN ASSISTANT
Payer: COMMERCIAL

## 2022-01-08 ENCOUNTER — OFFICE VISIT (OUTPATIENT)
Dept: URGENT CARE | Age: 62
End: 2022-01-08
Payer: COMMERCIAL

## 2022-01-08 VITALS
BODY MASS INDEX: 28.19 KG/M2 | WEIGHT: 143.56 LBS | HEART RATE: 76 BPM | DIASTOLIC BLOOD PRESSURE: 72 MMHG | HEIGHT: 60 IN | TEMPERATURE: 98.2 F | SYSTOLIC BLOOD PRESSURE: 125 MMHG

## 2022-01-08 DIAGNOSIS — M25.561 ACUTE PAIN OF RIGHT KNEE: ICD-10-CM

## 2022-01-08 DIAGNOSIS — M25.561 ACUTE PAIN OF RIGHT KNEE: Primary | ICD-10-CM

## 2022-01-08 PROCEDURE — 73564 X-RAY EXAM KNEE 4 OR MORE: CPT

## 2022-01-08 PROCEDURE — G0382 LEV 3 HOSP TYPE B ED VISIT: HCPCS | Performed by: PHYSICIAN ASSISTANT

## 2022-01-08 PROCEDURE — S9083 URGENT CARE CENTER GLOBAL: HCPCS | Performed by: PHYSICIAN ASSISTANT

## 2022-01-08 NOTE — PROGRESS NOTES
[facility]        NAME: Alexa Lopez is a 57 y o  male  : 1960    MRN: [id number]  DATE: 2022  TIME: 12:01 PM    Assessment and Plan   Acute pain of right knee [M25 561]  1  Acute pain of right knee  XR knee 4+ vw right injury         Patient Instructions       Follow up with PCP in 3-5 days  Proceed to  ER if symptoms worsen  Chief Complaint     Chief Complaint   Patient presents with    Leg Pain     Pt states has twisted his right foot and his leg is bothering him now,pain in his calf area   History of Present Illness       Patient for evaluation of pain in his right posterior leg and knee  Patient states that he twisted his right foot the other day and now has more pain it up into his right knee  He denies any direct trauma or fall  He denies any history of prior injury but does have history of chronic right knee pain  Leg Pain   Pertinent negatives include no numbness  Review of Systems   Review of Systems   Constitutional: Negative  Musculoskeletal: Positive for arthralgias and gait problem  Negative for joint swelling, neck pain and neck stiffness  Skin: Negative  Neurological: Negative for weakness and numbness  Current Medications       Current Outpatient Medications:     atorvastatin (LIPITOR) 20 mg tablet, Take 1 tablet (20 mg total) by mouth daily, Disp: 90 tablet, Rfl: 1    Current Allergies     Allergies as of 2022    (No Known Allergies)            The following portions of the patient's history were reviewed and updated as appropriate: allergies, current medications, past family history, past medical history, past social history, past surgical history and problem list      No past medical history on file      Past Surgical History:   Procedure Laterality Date    FOOT SURGERY         Family History   Problem Relation Age of Onset    Pancreatic cancer Mother     Liver disease Father         Chronic          Medications have been verified  Objective   /72   Pulse 76   Temp 98 2 °F (36 8 °C) (Temporal)   Ht 5' (1 524 m)   Wt 65 1 kg (143 lb 9 oz)   BMI 28 04 kg/m²   No LMP for male patient  Physical Exam     Physical Exam  Vitals and nursing note reviewed  Constitutional:       General: He is not in acute distress  Appearance: Normal appearance  He is well-developed  He is not ill-appearing, toxic-appearing or diaphoretic  HENT:      Head: Normocephalic and atraumatic  Eyes:      Extraocular Movements: Extraocular movements intact  Conjunctiva/sclera: Conjunctivae normal       Pupils: Pupils are equal, round, and reactive to light  Musculoskeletal:      Comments: Full range of motion of the right foot, ankle, knee  Patient has tenderness of the right posterior lateral knee  No effusion  No laxity  Negative Lachman's  Negative anterior posterior drawer sign  Strength 5/5  Patient walks with a limp and is using a cane for ambulation  No ecchymosis  No soft tissue swelling  No tenderness of the right ankle  Negative anterior posterior drawer sign of the right ankle  Skin:     General: Skin is warm and dry  Neurological:      General: No focal deficit present  Mental Status: He is alert and oriented to person, place, and time  Psychiatric:         Mood and Affect: Mood normal          Behavior: Behavior normal          Thought Content: Thought content normal          Judgment: Judgment normal         X-ray shows no acute fractures or findings

## 2022-01-08 NOTE — LETTER
January 8, 2022     Patient: Daisy Turon   YOB: 1960   Date of Visit: 1/8/2022       To Whom It May Concern:    Please excuse Jamel Troncoso from work 01/10/2022             Sincerely,        Paul Baldwin PA-C    CC: No Recipients

## 2022-01-08 NOTE — PATIENT INSTRUCTIONS
Rest injured body part  Ice 10-15 minutes off and on every 3-4 hours while awake for 48 hours after injury  After 48 hours you may start using warm compresses if appropriate  Compression use Ace wrap for support as needed  DO NOT wear to bed  Elevate injured body part as able to help decrease pain and swelling      Continue with use of cane as directed    Follow-up with orthopedics for further evaluation if symptoms persist  [phone]

## 2022-01-08 NOTE — LETTER
January 9, 2022     Patient: Antione Rubin   YOB: 1960   Date of Visit: 1/8/2022       To Whom It May Concern:    Please excuse Allison Ziegler from work 01/10/2022 through 01/12/2022             Sincerely,        Paul Baldwin PA-C    CC: No Recipients

## 2022-01-10 ENCOUNTER — OFFICE VISIT (OUTPATIENT)
Dept: FAMILY MEDICINE CLINIC | Facility: CLINIC | Age: 62
End: 2022-01-10

## 2022-01-10 VITALS
HEIGHT: 60 IN | HEART RATE: 83 BPM | OXYGEN SATURATION: 97 % | WEIGHT: 145.4 LBS | BODY MASS INDEX: 28.54 KG/M2 | TEMPERATURE: 97.8 F | RESPIRATION RATE: 18 BRPM | SYSTOLIC BLOOD PRESSURE: 138 MMHG | DIASTOLIC BLOOD PRESSURE: 92 MMHG

## 2022-01-10 DIAGNOSIS — M25.561 ACUTE PAIN OF RIGHT KNEE: Primary | ICD-10-CM

## 2022-01-10 DIAGNOSIS — E78.00 HIGH CHOLESTEROL: ICD-10-CM

## 2022-01-10 PROCEDURE — 1036F TOBACCO NON-USER: CPT | Performed by: FAMILY MEDICINE

## 2022-01-10 PROCEDURE — 99213 OFFICE O/P EST LOW 20 MIN: CPT | Performed by: FAMILY MEDICINE

## 2022-01-10 RX ORDER — ATORVASTATIN CALCIUM 20 MG/1
20 TABLET, FILM COATED ORAL DAILY
Qty: 90 TABLET | Refills: 1 | Status: SHIPPED | OUTPATIENT
Start: 2022-01-10 | End: 2022-06-23

## 2022-01-10 NOTE — PATIENT INSTRUCTIONS
Knee Pain   WHAT YOU NEED TO KNOW:   Knee pain may start suddenly, or it may be a long-term problem  You may have pain on the side, front, or back of your knee  You may have knee stiffness and swelling  You may hear popping sounds or feel like your knee is giving way or locking up as you walk  You may feel pain when you sit, stand, walk, or climb up and down stairs  Knee pain can be caused by conditions such as obesity, inflammation, or strains or tears in ligaments or tendons  DISCHARGE INSTRUCTIONS:   Return to the emergency department if:   · Your pain is worse, even after treatment  · You cannot bend or straighten your leg completely  · The swelling around your knee does not go down even with treatment  · Your knee is painful and hot to the touch  Contact your healthcare provider if:   · You have questions or concerns about your condition or care  Medicines: You may need any of the following:  · NSAIDs  help decrease swelling and pain or fever  This medicine is available with or without a doctor's order  NSAIDs can cause stomach bleeding or kidney problems in certain people  If you take blood thinner medicine, always ask your healthcare provider if NSAIDs are safe for you  Always read the medicine label and follow directions  · Acetaminophen  decreases pain and fever  It is available without a doctor's order  Ask how much to take and how often to take it  Follow directions  Read the labels of all other medicines you are using to see if they also contain acetaminophen, or ask your doctor or pharmacist  Acetaminophen can cause liver damage if not taken correctly  Do not use more than 4 grams (4,000 milligrams) total of acetaminophen in one day  · Prescription pain medicine  may be given  Ask your healthcare provider how to take this medicine safely  Some prescription pain medicines contain acetaminophen   Do not take other medicines that contain acetaminophen without talking to your healthcare provider  Too much acetaminophen may cause liver damage  Prescription pain medicine may cause constipation  Ask your healthcare provider how to prevent or treat constipation  · Take your medicine as directed  Contact your healthcare provider if you think your medicine is not helping or if you have side effects  Tell him or her if you are allergic to any medicine  Keep a list of the medicines, vitamins, and herbs you take  Include the amounts, and when and why you take them  Bring the list or the pill bottles to follow-up visits  Carry your medicine list with you in case of an emergency  What you can do to manage your symptoms:   · Rest your knee so it can heal   Limit activities that increase your pain  Do low-impact exercises, such as walking or swimming  · Apply ice to help reduce swelling and pain  Use an ice pack, or put crushed ice in a plastic bag  Cover it with a towel before you apply it to your knee  Apply ice for 15 to 20 minutes every hour, or as directed  · Apply compression to help reduce swelling  Use a brace or bandage only as directed  · Elevate your knee to help decrease pain and swelling  Elevate your knee while you are sitting or lying down  Prop your leg on pillows to keep your knee above the level of your heart  · Prevent your knee from moving as directed  Your healthcare provider may put on a cast or splint  You may need to wear a leg brace to stabilize your knee  A leg brace can be adjusted to increase your range of motion as your knee heals  What you can do to prevent knee pain:   · Maintain a healthy weight  Extra weight increases your risk for knee pain  Ask your healthcare provider how much you should weigh  He or she can help you create a safe weight loss plan if you need to lose weight  · Exercise or train properly  Use the correct equipment for sports  Wear shoes that provide good support   Check your posture often as you exercise, play sports, or train for an event  This can help prevent stress and strain on your knees  Rest between sessions so you do not overwork your knees  Follow up with your healthcare provider within 24 hours or as directed: You may need follow-up treatments, such as steroid injections to decrease pain  Write down your questions so you remember to ask them during your visits  © Copyright [employer] 2021 Information is for End User's use only and may not be sold, redistributed or otherwise used for commercial purposes  All illustrations and images included in CareNotes® are the copyrighted property of [employer]  or [facility] Perri Donovan   The above information is an  only  It is not intended as medical advice for individual conditions or treatments  Talk to your doctor, nurse or pharmacist before following any medical regimen to see if it is safe and effective for you

## 2022-01-10 NOTE — ASSESSMENT & PLAN NOTE
R leg pain posterior knee following twisting foot on 12/30  Urgent care visit 1/8 with X ray demonstrating mild lateral tibiofemoral compartment osteoarthritis  Pain is worsening and pt has trouble walking, ambulates with limp using cane he had from home  - supportive care with tylenol, motrin PRN and voltaren gel  - after discussion especially with walking difficulty, agreeable to orthopedic referral and MRI knee to look for any further cause of injury  - follow up pending results per pt preference

## 2022-01-10 NOTE — PROGRESS NOTES
Assessment/Plan:    Acute pain of right knee  R leg pain posterior knee following twisting foot on 12/30  Urgent care visit 1/8 with X ray demonstrating mild lateral tibiofemoral compartment osteoarthritis  Pain is worsening and pt has trouble walking, ambulates with limp using cane he had from home  - supportive care with tylenol, motrin PRN and voltaren gel  - after discussion especially with walking difficulty, agreeable to orthopedic referral and MRI knee to look for any further cause of injury  - follow up pending results per pt preference      Diagnoses and all orders for this visit:    Acute pain of right knee  -     Ambulatory referral to Orthopedic Surgery; Future  -     MRI knee right  wo contrast; Future  -     Diclofenac Sodium (VOLTAREN) 1 %; Apply 2 g topically 4 (four) times a day    High cholesterol  -     atorvastatin (LIPITOR) 20 mg tablet; Take 1 tablet (20 mg total) by mouth daily        Subjective:      Patient ID: Marielos Garcia is a 57 y o  male  HPI  58 yo male presenting with leg pain and medication refill  R leg pain over 2 weeks  Sitting feels ok, when standing and walking it is painful  Today a little swollen  Saw urgent care on 1/8 for this after twisting his foot the other day 12/30  On Friday prior sudden worsened pain and pt felt like he could not stand on it  X ray R knee with mild lateral tibiofemoral compartment osteoarthritis  Not taking any medication or cream, urgent care only gave him ace wrap  Pt is using an extra cane/walker he had at home to help him walk  Also requesting refill on atorvastatin  Otherwise no new symptoms or concerns  The following portions of the patient's history were reviewed and updated as appropriate: allergies, current medications, past family history, past medical history, past social history, past surgical history and problem list     Review of Systems   Constitutional: Negative for chills and fever     HENT: Negative for congestion and rhinorrhea  Respiratory: Negative for cough and shortness of breath  Cardiovascular: Negative for chest pain and leg swelling  Gastrointestinal: Negative for abdominal pain, diarrhea, nausea and vomiting  Musculoskeletal: Positive for arthralgias and myalgias  Skin: Negative for rash and wound  Neurological: Negative for dizziness, light-headedness and headaches  Psychiatric/Behavioral: Negative for agitation and confusion  Objective:    /92 (BP Location: Left arm, Patient Position: Sitting, Cuff Size: Standard)   Pulse 83   Temp 97 8 °F (36 6 °C) (Temporal)   Resp 18   Ht 5' (1 524 m)   Wt 66 kg (145 lb 6 4 oz)   SpO2 97%   BMI 28 40 kg/m²      Physical Exam  Vitals reviewed  Constitutional:       General: He is not in acute distress  Appearance: He is well-developed  HENT:      Head: Normocephalic and atraumatic  Eyes:      Extraocular Movements: Extraocular movements intact  Conjunctiva/sclera: Conjunctivae normal    Cardiovascular:      Rate and Rhythm: Normal rate and regular rhythm  Heart sounds: Normal heart sounds  No murmur heard  Pulmonary:      Effort: Pulmonary effort is normal  No respiratory distress  Breath sounds: Normal breath sounds  No wheezing or rales  Abdominal:      General: Bowel sounds are normal  There is no distension  Palpations: Abdomen is soft  Tenderness: There is no abdominal tenderness  Musculoskeletal:         General: Swelling present  No tenderness  Cervical back: Normal range of motion and neck supple  Right lower leg: No edema  Left lower leg: No edema  Comments: Intact ROM b/l LE  R leg tenderness to palpation and movement  Walks slowly with limp on R side  Skin:     General: Skin is warm and dry  Findings: No rash  Neurological:      General: No focal deficit present  Mental Status: He is alert  Mental status is at baseline     Psychiatric:         Mood and Affect: Mood normal          Behavior: Behavior normal            Carolann Salvador MD, PGY2  [facility]  Date: 1/10/2022 Time: 3:11 PM

## 2022-01-10 NOTE — LETTER
January 10, 2022     Patient: Lorraine Steward   YOB: 1960   Date of Visit: 1/10/2022       To Whom it May Concern:    Lisa  is under my professional care  He was seen in my office on 1/10/2022  He may return to work on 1/13  Please excuse him from work today through Wednesday 1/12  If you have any questions or concerns, please don't hesitate to call           Sincerely,          Miya Hicks MD        CC: No Recipients

## 2022-01-12 ENCOUNTER — TELEPHONE (OUTPATIENT)
Dept: FAMILY MEDICINE CLINIC | Facility: CLINIC | Age: 62
End: 2022-01-12

## 2022-01-12 NOTE — TELEPHONE ENCOUNTER
This patient was scheduled for  MRI KNEE RIGHT WO CON  The information I submitted was not enough to get it approved, so [facility] is requesting a [id number]  If you are able to do so, the number is below  If you wish to withdraw this request let us know so we can call central scheduling to cancel their upcoming appointment       IVAN:  [phone] Option #3  Tracking Number: [id number]   Insurance: [facility] CROSS  ID#: [id number]     Romelia Dillard   NPI: [id number]    Case is time sensitive please respond within 24 hours of this message    Thanks,   Teresa

## 2022-01-26 ENCOUNTER — TELEPHONE (OUTPATIENT)
Dept: FAMILY MEDICINE CLINIC | Facility: CLINIC | Age: 62
End: 2022-01-26

## 2022-01-26 NOTE — TELEPHONE ENCOUNTER
Folder -Dr Yudith Bermudez    Form to be completed by -Feb 2,2022     Name of Form - [employer]    Form to be Faxed [phone],    Patient was made aware of the 7 business day form policy        Patient was seen for the issue on 1/10/2022

## 2022-01-28 ENCOUNTER — OFFICE VISIT (OUTPATIENT)
Dept: FAMILY MEDICINE CLINIC | Facility: CLINIC | Age: 62
End: 2022-01-28

## 2022-01-28 VITALS
HEIGHT: 60 IN | HEART RATE: 87 BPM | RESPIRATION RATE: 18 BRPM | TEMPERATURE: 98.1 F | WEIGHT: 144.4 LBS | DIASTOLIC BLOOD PRESSURE: 84 MMHG | SYSTOLIC BLOOD PRESSURE: 138 MMHG | BODY MASS INDEX: 28.35 KG/M2 | OXYGEN SATURATION: 97 %

## 2022-01-28 DIAGNOSIS — M25.561 ACUTE PAIN OF RIGHT KNEE: Primary | ICD-10-CM

## 2022-01-28 DIAGNOSIS — Z02.89 ENCOUNTER FOR COMPLETION OF FORM WITH PATIENT: ICD-10-CM

## 2022-01-28 PROCEDURE — 3008F BODY MASS INDEX DOCD: CPT | Performed by: FAMILY MEDICINE

## 2022-01-28 PROCEDURE — 99213 OFFICE O/P EST LOW 20 MIN: CPT | Performed by: FAMILY MEDICINE

## 2022-01-28 NOTE — ASSESSMENT & PLAN NOTE
R leg pain posterior knee following twisting foot on 12/30 [facility] MENTAL HEALTH IMPROVED today  No tenderness, ROM intact, and can ambulate without cane    Following with [facility] ortho, received steroid injection to knee with improvement   - continue supportive care   - continue follow ortho in 2 weeks, appreciate recs   If pain persistent or worsening would consider MRI per ortho discretion   - [place] filled for short leave of absence earlier this month, filled previously and scanned into chart but there was issue with insurance   - follow up as needed

## 2022-01-28 NOTE — PROGRESS NOTES
Assessment/Plan:    Acute pain of right knee  R leg pain posterior knee following twisting foot on 12/30 [facility] HEALTH IMPROVED today  No tenderness, ROM intact, and can ambulate without cane    Following with [facility] ortho, received steroid injection to knee with improvement   - continue supportive care   - continue follow ortho in 2 weeks, appreciate recs  If pain persistent or worsening would consider MRI per ortho discretion   - FMLA paperwork filled for short leave of absence earlier this month, filled previously and scanned into chart but there was issue with insurance   - follow up as needed        Diagnoses and all orders for this visit:    Acute pain of right knee    Encounter for completion of form with patient          Subjective:      Patient ID: Kenisha Bradford is a 57 y o  male  HPI  56 yo male presenting for f/u R knee pain and FMLA paperwork  Pt reports knee pain has improved  Was seeing [facility] ortho instead of Dr Kasey Beckford  Took a pack of steroids which did not help, so the second visit he received steroid injection into the knee and pt feels much better  States knee is "90% better"  Ambulating without cane  Has appt with ortho again in 2 weeks for follow up  Plans to cancel appt with Dr Kasey Beckford at later date  The following portions of the patient's history were reviewed and updated as appropriate: allergies, current medications, past family history, past medical history, past social history, past surgical history and problem list     Review of Systems   Constitutional: Negative for chills and fever  HENT: Negative for congestion and rhinorrhea  Respiratory: Negative for cough and shortness of breath  Cardiovascular: Negative for chest pain and leg swelling  Gastrointestinal: Negative for abdominal pain, diarrhea, nausea and vomiting  Genitourinary: Negative for difficulty urinating  Musculoskeletal: Negative for arthralgias, gait problem and myalgias     Skin: Negative for rash and wound  Neurological: Negative for dizziness, light-headedness and headaches  Psychiatric/Behavioral: Negative for agitation and confusion  Objective:      /84   Pulse 87   Temp 98 1 °F (36 7 °C) (Temporal)   Resp 18   Ht 5' (1 524 m)   Wt 65 5 kg (144 lb 6 4 oz)   SpO2 97%   BMI 28 20 kg/m²          Physical Exam  Vitals reviewed  Constitutional:       General: He is not in acute distress  Appearance: He is well-developed  HENT:      Head: Normocephalic and atraumatic  Eyes:      Extraocular Movements: Extraocular movements intact  Conjunctiva/sclera: Conjunctivae normal    Cardiovascular:      Rate and Rhythm: Normal rate and regular rhythm  Heart sounds: Normal heart sounds  No murmur heard  Pulmonary:      Effort: Pulmonary effort is normal  No respiratory distress  Breath sounds: Normal breath sounds  No wheezing or rales  Abdominal:      General: Bowel sounds are normal  There is no distension  Palpations: Abdomen is soft  Tenderness: There is no abdominal tenderness  Musculoskeletal:         General: No tenderness or deformity  Normal range of motion  Cervical back: Normal range of motion and neck supple  Right lower leg: No edema  Left lower leg: No edema  Comments: No knee tenderness, ROM intact, regular ambulation without limp   Skin:     General: Skin is warm and dry  Capillary Refill: Capillary refill takes less than 2 seconds  Findings: No rash  Neurological:      General: No focal deficit present  Mental Status: He is alert and oriented to person, place, and time  Mental status is at baseline        Gait: Gait normal    Psychiatric:         Mood and Affect: Mood normal          Behavior: Behavior normal            Francisco Garza MD, PGY2  [address] [facility]  Date: 1/28/2022 Time: 4:39 PM

## 2022-02-02 ENCOUNTER — CLINICAL SUPPORT (OUTPATIENT)
Dept: FAMILY MEDICINE CLINIC | Facility: CLINIC | Age: 62
End: 2022-02-02

## 2022-02-02 DIAGNOSIS — Z23 ENCOUNTER FOR IMMUNIZATION: Primary | ICD-10-CM

## 2022-02-02 PROCEDURE — 90471 IMMUNIZATION ADMIN: CPT

## 2022-02-02 PROCEDURE — 90715 TDAP VACCINE 7 YRS/> IM: CPT

## 2022-06-21 ENCOUNTER — TELEPHONE (OUTPATIENT)
Dept: FAMILY MEDICINE CLINIC | Facility: CLINIC | Age: 62
End: 2022-06-21

## 2022-06-21 NOTE — TELEPHONE ENCOUNTER
Left V/M for patient to schedule an appointment with Dr Chapo Booker to get a referral for [facility]   Patient has not been seen since February

## 2022-06-23 DIAGNOSIS — E78.00 HIGH CHOLESTEROL: ICD-10-CM

## 2022-06-23 RX ORDER — ATORVASTATIN CALCIUM 20 MG/1
TABLET, FILM COATED ORAL
Qty: 90 TABLET | Refills: 3 | Status: SHIPPED | OUTPATIENT
Start: 2022-06-23

## 2023-06-19 DIAGNOSIS — E78.00 HIGH CHOLESTEROL: ICD-10-CM

## 2023-06-20 RX ORDER — ATORVASTATIN CALCIUM 20 MG/1
TABLET, FILM COATED ORAL
Qty: 90 TABLET | Refills: 3 | Status: SHIPPED | OUTPATIENT
Start: 2023-06-20

## 2023-06-27 ENCOUNTER — OFFICE VISIT (OUTPATIENT)
Dept: FAMILY MEDICINE CLINIC | Facility: CLINIC | Age: 63
End: 2023-06-27

## 2023-06-27 VITALS
TEMPERATURE: 97.5 F | BODY MASS INDEX: 27.84 KG/M2 | WEIGHT: 141.8 LBS | SYSTOLIC BLOOD PRESSURE: 127 MMHG | HEIGHT: 60 IN | HEART RATE: 77 BPM | DIASTOLIC BLOOD PRESSURE: 80 MMHG

## 2023-06-27 DIAGNOSIS — M79.605 LEG PAIN, POSTERIOR, LEFT: Primary | ICD-10-CM

## 2023-06-27 NOTE — PROGRESS NOTES
Name: Rohit Rosa      : 1960      MRN: [id number]  Encounter Provider: Adriana Sheehan MD  Encounter Date: 2023   Encounter department: [facility]     1  Leg pain, posterior, left  Assessment & Plan:  L leg pain, posterior to knee in popliteal region and some in calf  Worse while walking and resolves at rest  Hx of meniscal tear and OA in R knee with popliteal cyst rupture and effusion which was drained in , followed with [facility] ortho  - start w/u with U/S [facility] to evaluate for popliteal cyst and doppler U/S to r/o vascular etiology though low likelihood of thrombus  - symptomatic care with tylenol or motrin prn, ice therapy, gels/creams prn  - f/u 2 weeks per pt request     Orders:  -     [address] MSK limited; Future; Expected date: 2023  -     VAS lower limb venous duplex study, unilateral/limited; Future; Expected date: 2023         Subjective      HPI   59 yo male presenting for pain in L leg x 5 days  When walking feels a lot of pressure behind knee, feels like someone squeezing leg  Started last Friday, staying the same and not worsening  Only occurs when walking  When sitting, pain goes away completely  No rash, swelling  No trauma to the area  Pt had prior hx of R knee pain saw [facility] orthopedic and eventually requiring MRI R knee which showed medial meniscal tear and mild to moderate medial compartment primary OA, also with prominent joint effusion and partially ruptured popliteal cyst  Pt reports initial steroid injection helped briefly but pain returned and after MRI they removed fluid and his symptoms resolved  This was in 2022  Pt reports current pain may or may not feel similar to before but before it was in front of knee and not behind  Review of Systems   Constitutional: Negative for chills and fever  HENT: Negative for ear pain and sore throat  Eyes: Negative for pain and visual disturbance     Respiratory: Negative for cough and shortness of breath  Cardiovascular: Negative for chest pain and palpitations  Gastrointestinal: Negative for abdominal pain and vomiting  Genitourinary: Negative for dysuria and hematuria  Musculoskeletal: Positive for arthralgias, gait problem and myalgias  Negative for back pain  Skin: Negative for color change and rash  Neurological: Negative for seizures and syncope  All other systems reviewed and are negative  Current Outpatient Medications on File Prior to Visit   Medication Sig   • atorvastatin (LIPITOR) 20 mg tablet TAKE 1 TABLET DAILY   • Diclofenac Sodium (VOLTAREN) 1 % Apply 2 g topically 4 (four) times a day (Patient not taking: Reported on 6/27/2023)       Objective     /80 (BP Location: Left arm, Patient Position: Sitting, Cuff Size: Large)   Pulse 77   Temp 97 5 °F (36 4 °C) (Temporal)   Ht 5' (1 524 m)   Wt 64 3 kg (141 lb 12 8 oz)   BMI 27 69 kg/m²     Physical Exam  Vitals reviewed  Constitutional:       General: He is not in acute distress  Appearance: He is well-developed  HENT:      Head: Normocephalic and atraumatic  Eyes:      Extraocular Movements: Extraocular movements intact  Conjunctiva/sclera: Conjunctivae normal    Cardiovascular:      Rate and Rhythm: Normal rate and regular rhythm  Heart sounds: Normal heart sounds  No murmur heard  Pulmonary:      Effort: Pulmonary effort is normal  No respiratory distress  Musculoskeletal:         General: No swelling, tenderness or deformity  Normal range of motion  Cervical back: Normal range of motion and neck supple  Right lower leg: No edema  Left lower leg: No edema  Comments: Not TTP, no swelling, no skin changes   Skin:     General: Skin is warm and dry  Findings: No rash  Neurological:      General: No focal deficit present  Mental Status: He is alert  Mental status is at baseline     Psychiatric:         Mood and Affect: Mood normal  Behavior: Behavior normal           Adriana Sheehan MD

## 2023-06-27 NOTE — PATIENT INSTRUCTIONS
Leg Pain   WHAT YOU NEED TO KNOW:   Leg pain may be caused by a variety of health conditions  Your tests did not show any broken bones or blood clots  DISCHARGE INSTRUCTIONS:   Return to the emergency department if:   You have a fever  Your leg starts to swell  Your leg pain gets worse  You have numbness or tingling in your leg or toes  You cannot put any weight on or move your leg  Contact your healthcare provider if:   Your pain does not decrease, even after treatment  You have questions or concerns about your condition or care  Medicines:   NSAIDs , such as ibuprofen, help decrease swelling, pain, and fever  This medicine is available with or without a doctor's order  NSAIDs can cause stomach bleeding or kidney problems in certain people  If you take blood thinner medicine, always ask your healthcare provider if NSAIDs are safe for you  Always read the medicine label and follow directions  Take your medicine as directed  Contact your healthcare provider if you think your medicine is not helping or if you have side effects  Tell your provider if you are allergic to any medicine  Keep a list of the medicines, vitamins, and herbs you take  Include the amounts, and when and why you take them  Bring the list or the pill bottles to follow-up visits  Carry your medicine list with you in case of an emergency  Follow up with your healthcare provider as directed: You may need more tests to find the cause of your leg pain  You may need to see an orthopedic specialist or a physical therapist  Write down your questions so you remember to ask them during your visits  Manage your leg pain:   Rest  your injured leg so that it can heal  You may need an immobilizer, brace, or splint to limit the movement of your leg  You may need to avoid putting any weight on your leg for at least 48 hours  Return to normal activities as directed      Ice  the injury for 20 minutes every 4 hours for up to 24 hours, or as directed  Use an ice pack, or put crushed ice in a plastic bag  Cover it with a towel to protect your skin  Ice helps prevent tissue damage and decreases swelling and pain  Elevate  your injured leg above the level of your heart as often as you can  This will help decrease swelling and pain  If possible, prop your leg on pillows or blankets to keep the area elevated comfortably  Use assistive devices as directed  You may need to use a cane or crutches  Assistive devices help decrease pain and pressure on your leg when you walk  Ask your healthcare provider for more information about assistive devices and how to use them correctly  Maintain a healthy weight  Extra body weight can cause pressure and pain in your hip, knee, and ankle joints  Ask your healthcare provider how much you should weigh  Ask him to help you create a weight loss plan if you are overweight  © Copyright Kenzie Boyer 2022 Information is for End User's use only and may not be sold, redistributed or otherwise used for commercial purposes  The above information is an  only  It is not intended as medical advice for individual conditions or treatments  Talk to your doctor, nurse or pharmacist before following any medical regimen to see if it is safe and effective for you

## 2023-06-27 NOTE — ASSESSMENT & PLAN NOTE
L leg pain, posterior to knee in popliteal region and some in calf   Worse while walking and resolves at rest  Hx of meniscal tear and OA in R knee with popliteal cyst rupture and effusion which was drained in 2022, followed with [facility] ortho  - start w/u with U/S [facility] to evaluate for popliteal cyst and doppler U/S to r/o vascular etiology though low likelihood of thrombus  - symptomatic care with tylenol or motrin prn, ice therapy, gels/creams prn  - f/u 2 weeks per pt request

## 2023-06-30 ENCOUNTER — OFFICE VISIT (OUTPATIENT)
Dept: URGENT CARE | Age: 63
End: 2023-06-30
Payer: COMMERCIAL

## 2023-06-30 VITALS
TEMPERATURE: 97.8 F | SYSTOLIC BLOOD PRESSURE: 134 MMHG | RESPIRATION RATE: 18 BRPM | HEART RATE: 76 BPM | OXYGEN SATURATION: 98 % | DIASTOLIC BLOOD PRESSURE: 80 MMHG

## 2023-06-30 DIAGNOSIS — M25.562 ACUTE PAIN OF LEFT KNEE: Primary | ICD-10-CM

## 2023-06-30 PROCEDURE — S9083 URGENT CARE CENTER GLOBAL: HCPCS | Performed by: NURSE PRACTITIONER

## 2023-06-30 PROCEDURE — G0382 LEV 3 HOSP TYPE B ED VISIT: HCPCS | Performed by: NURSE PRACTITIONER

## 2023-06-30 NOTE — LETTER
June 30, 2023     Patient: Chris Ragsdale   YOB: 1960   Date of Visit: 6/30/2023       To Whom it May Concern:    Peyton Charles was seen in my clinic on 6/30/2023  He may return to work on 7/5/2023   If you have any questions or concerns, please don't hesitate to call           Sincerely,          [facility]        CC: No Recipients

## 2023-06-30 NOTE — PATIENT INSTRUCTIONS
Continues to use ACE wrap for comfort  Ice, rest, and elevate the leg   Continue the work up that your PCP has ordered, including the ultrasound and venous duplex study   Call your orthopedics for appointment since your knee is now giving out   Diclofenac gel as ordered     Knee Pain   WHAT YOU NEED TO KNOW:   Knee pain may start suddenly, or it may be a long-term problem  You may have pain on the side, front, or back of your knee  You may have knee stiffness and swelling  You may hear popping sounds or feel like your knee is giving way or locking up as you walk  You may feel pain when you sit, stand, walk, or climb up and down stairs  Knee pain can be caused by conditions such as obesity, inflammation, or strains or tears in ligaments or tendons  DISCHARGE INSTRUCTIONS:   Return to the emergency department if:   Your pain is worse, even after treatment  You cannot bend or straighten your leg completely  The swelling around your knee does not go down even with treatment  Your knee is painful and hot to the touch  Contact your healthcare provider if:   You have questions or concerns about your condition or care  Medicines: You may need any of the following:  NSAIDs  help decrease swelling and pain or fever  This medicine is available with or without a doctor's order  NSAIDs can cause stomach bleeding or kidney problems in certain people  If you take blood thinner medicine, always ask your healthcare provider if NSAIDs are safe for you  Always read the medicine label and follow directions  Acetaminophen  decreases pain and fever  It is available without a doctor's order  Ask how much to take and how often to take it  Follow directions  Read the labels of all other medicines you are using to see if they also contain acetaminophen, or ask your doctor or pharmacist  Acetaminophen can cause liver damage if not taken correctly  Prescription pain medicine  may be given   Ask your healthcare provider how to take this medicine safely  Some prescription pain medicines contain acetaminophen  Do not take other medicines that contain acetaminophen without talking to your healthcare provider  Too much acetaminophen may cause liver damage  Prescription pain medicine may cause constipation  Ask your healthcare provider how to prevent or treat constipation  Take your medicine as directed  Contact your healthcare provider if you think your medicine is not helping or if you have side effects  Tell your provider if you are allergic to any medicine  Keep a list of the medicines, vitamins, and herbs you take  Include the amounts, and when and why you take them  Bring the list or the pill bottles to follow-up visits  Carry your medicine list with you in case of an emergency  What you can do to manage your symptoms:   Rest your knee so it can heal   Limit activities that increase your pain  Do low-impact exercises, such as walking or swimming  Apply ice to help reduce swelling and pain  Use an ice pack, or put crushed ice in a plastic bag  Cover it with a towel before you apply it to your knee  Apply ice for 15 to 20 minutes every hour, or as directed  Apply compression to help reduce swelling  Use a brace or bandage only as directed  Elevate your knee to help decrease pain and swelling  Elevate your knee while you are sitting or lying down  Prop your leg on pillows to keep your knee above the level of your heart  Prevent your knee from moving as directed  Your healthcare provider may put on a cast or splint  You may need to wear a leg brace to stabilize your knee  A leg brace can be adjusted to increase your range of motion as your knee heals  What you can do to prevent knee pain:   Maintain a healthy weight  Extra weight increases your risk for knee pain  Ask your healthcare provider how much you should weigh   He or she can help you create a safe weight loss plan if you need to lose weight  Exercise or train properly  Use the correct equipment for sports  Wear shoes that provide good support  Check your posture often as you exercise, play sports, or train for an event  This can help prevent stress and strain on your knees  Rest between sessions so you do not overwork your knees  Follow up with your healthcare provider within 24 hours or as directed: You may need follow-up treatments, such as steroid injections to decrease pain  Write down your questions so you remember to ask them during your visits  © Copyright Deny Bach 2022 Information is for End User's use only and may not be sold, redistributed or otherwise used for commercial purposes  The above information is an  only  It is not intended as medical advice for individual conditions or treatments  Talk to your doctor, nurse or pharmacist before following any medical regimen to see if it is safe and effective for you

## 2023-06-30 NOTE — PROGRESS NOTES
[facility]        NAME: Butch Brandt is a 58 y o  male  : 1960    MRN: [id number]  DATE: 2023  TIME: 8:23 AM    Assessment and Plan   Acute pain of left knee [M25 562]  1  Acute pain of left knee  Ambulatory Referral to Orthopedic Surgery        Work-up of knee pain is in progress by the primary care provider  There was no new injury to the knee today  No x-ray indicated at this time  Offered a hinged knee brace however he declined stating that he will continue to use his Ace wrap  Advised to use Voltaren gel as previously ordered  He may also use Tylenol and/or Motrin as needed for pain  Rest and ice  He is requesting a work note for today  He states that his next day to return to work is  due to the holiday  Patient Instructions       Follow up with PCP in 3-5 days  Proceed to  ER if symptoms worsen  Chief Complaint     Chief Complaint   Patient presents with   • Leg Pain     Patient been having issues with his left leg for about  1 week - per patient his leg gave out this am and he fell- pain behind his knee into the his thigh area         History of Present Illness       Patient is a 70-year-old male presenting with left posterior knee pain  He states it started about 1 week ago  Denies injury  He saw his primary care doctor 3 days ago and a work-up was initiated  An ultrasound of the posterior knee and a vascular duplex study was ordered  He states he scheduled them but has not had them completed yet  This morning when he was walking to his car to go to work his left knee gave out and he fell to the ground  He states that he landed on his buttock  He was able to get up on his own  He reports having increased pain today  He was prescribed Voltaren gel by his primary care provider but has yet to pick it up at the pharmacy  He is wearing an Ace wrap  With the fall he denies striking his head, LOC, or injury to any other body part    No other medications attempted  He denies swelling, ecchymosis, numbness, tingling, or weakness  Review of Systems   Review of Systems   Constitutional: Negative for activity change, chills and fever  Respiratory: Negative for cough  Musculoskeletal: Positive for arthralgias  Negative for joint swelling  Skin: Negative for color change and wound  Neurological: Negative for weakness and numbness  Current Medications       Current Outpatient Medications:   •  atorvastatin (LIPITOR) 20 mg tablet, TAKE 1 TABLET DAILY, Disp: 90 tablet, Rfl: 3  •  Diclofenac Sodium (VOLTAREN) 1 %, Apply 2 g topically 4 (four) times a day (Patient not taking: Reported on 6/27/2023), Disp: 50 g, Rfl: 0    Current Allergies     Allergies as of 06/30/2023   • (No Known Allergies)            The following portions of the patient's history were reviewed and updated as appropriate: allergies, current medications, past family history, past medical history, past social history, past surgical history and problem list      No past medical history on file  Past Surgical History:   Procedure Laterality Date   • FOOT SURGERY         Family History   Problem Relation Age of Onset   • Pancreatic cancer Mother    • Liver disease Father         Chronic          Medications have been verified  Objective   /80 (BP Location: Right arm, Patient Position: Sitting, Cuff Size: Standard)   Pulse 76   Temp 97 8 °F (36 6 °C)   Resp 18   SpO2 98%        Physical Exam     Physical Exam  Vitals reviewed  Constitutional:       General: He is awake  He is not in acute distress  Appearance: Normal appearance  He is normal weight  Cardiovascular:      Rate and Rhythm: Normal rate  Pulmonary:      Effort: Pulmonary effort is normal    Musculoskeletal:      Left knee: No swelling, erythema, ecchymosis, lacerations or bony tenderness  Decreased range of motion  Tenderness ( posterior knee) present  Normal pulse     Neurological:      General: No focal deficit present  Mental Status: He is alert and oriented to person, place, and time  Psychiatric:         Behavior: Behavior is cooperative

## 2023-07-04 NOTE — PROGRESS NOTES
Name: Micheline Lawrence      : 1960      MRN: [id number]  Encounter Provider: Jo-Ann Blancas DO  Encounter Date: 2023   Encounter department: [facility]    Assessment & Plan     1. Posterior left knee pain  Assessment & Plan:  Left posterior midline knee pain started on 23. Came in to office on 23. Prescribed voltaren gel, ice, and tylenol prn for pain. Reports improvement of pain today from 10/10 to 8/10 after using voltaren gel and ibuprofen. However, still unable to return to work. Has h/o of right popliteal cyst with pain that resolved after drainage back in  with [facility]. Pt likely has a left-sided popliteal cyst given that he endorses current left posterior midline pain/pressure is very similar to the right one he had in . Works in [employer] involving a lot of walking and climbing. Denies any trauma. Less likely to be DVT given negative hommans sign, no abdominal bruit on auscultation and no h/o smoking    Plan:   -c/w voltaren gel, tylenol, and ice prn for pain  -duplex scheduled for 2023; ultrasound scheduled for 2023--spoke with Maryam Dumont in attempt to move date forward, couldn't.   -pt self-scheduled with [facility] for appt on 2023. May be able to get in-office ultrasound and treatment done there.  -provided work note  -follow-up after ultrasound and duplex completed or prn if imaging/treatment done with [facility]      Orders:  -     Diclofenac Sodium (VOLTAREN) 1 %; Apply 2 g topically 4 (four) times a day         Subjective      Mr. Jacob Pantoja is a 58YO M w/PMHx of hypercholesterolemia and OA who presents with continued c/o left posterior knee pain. Has h/o right medial meniscal tear, right knee popliteal cyst rupture, effusion and drainage done in  following [facility]. Patient was previously in office on 2023 and had complained of 5 day h/o posterior knee pain.   [facility] limited ultrasound and venous duplex study had been ordered. Was advised to use voltaren gel prn, tylenol prn and ice prn. Has been taking voltaren gel and ibuprofen which improved the pain from 10/10 to 8/10. Pt works in a [employer] where he is walking and climbing a lot, not on his knees. Pain specifically mid-line posterior knee. Denies any pain in his left calf. Denies any h/o clots or smoking. Endorses that this pain/pressure feels very similar to his right popliteal cyst back in 2022. Currently has duplex scheduled for 7/6/2023, [facility] ultrasound 7/28/2023. Also made [facility] appointment for this Friday 7/7/2023. Review of Systems   Constitutional: Negative for chills and fever. Eyes: Negative for visual disturbance. Respiratory: Negative for shortness of breath. Cardiovascular: Negative for chest pain. Gastrointestinal: Negative for abdominal pain. Musculoskeletal:        Left posterior knee pain. Ambulating currently with cane, but is able to bear weight   Skin: Negative for color change, pallor, rash and wound. All other systems reviewed and are negative. Current Outpatient Medications on File Prior to Visit   Medication Sig   • atorvastatin (LIPITOR) 20 mg tablet TAKE 1 TABLET DAILY   • [DISCONTINUED] Diclofenac Sodium (VOLTAREN) 1 % Apply 2 g topically 4 (four) times a day       Objective     /77   Pulse 98   Temp 97.6 °F (36.4 °C)   Resp 16   Wt 64.4 kg (142 lb)   BMI 27.73 kg/m²     Physical Exam  Vitals reviewed. Constitutional:       General: He is not in acute distress. Appearance: Normal appearance. He is not ill-appearing, toxic-appearing or diaphoretic. HENT:      Head: Normocephalic and atraumatic. Cardiovascular:      Rate and Rhythm: Normal rate and regular rhythm. Pulses: Normal pulses. Heart sounds: Normal heart sounds. No murmur heard. No friction rub. No gallop. Pulmonary:      Effort: Pulmonary effort is normal. No respiratory distress. Breath sounds: Normal breath sounds. No stridor. No wheezing, rhonchi or rales. Chest:      Chest wall: No tenderness. Abdominal:      General: There is no distension. Palpations: Abdomen is soft. There is no mass. Tenderness: There is no abdominal tenderness. There is no guarding or rebound. Hernia: No hernia is present. Comments: No abdominal bruit appreciated on auscultation   Musculoskeletal:         General: Tenderness (left posterior knee, midline) present. No swelling. Normal range of motion. Right lower leg: No edema. Left lower leg: No edema. Comments: Able to bear weight, currently ambulating with cane for pain. Slow gait, but otherwise normal. No appreciable mass or lesion on left posterior knee. Negative homman sign. Skin:     General: Skin is warm and dry. Capillary Refill: Capillary refill takes less than 2 seconds. Coloration: Skin is not jaundiced or pale. Findings: No bruising, erythema, lesion or rash. Neurological:      Mental Status: He is alert.        Sarath Rivera DO

## 2023-07-05 ENCOUNTER — OFFICE VISIT (OUTPATIENT)
Dept: FAMILY MEDICINE CLINIC | Facility: CLINIC | Age: 63
End: 2023-07-05

## 2023-07-05 VITALS
RESPIRATION RATE: 16 BRPM | TEMPERATURE: 97.6 F | HEART RATE: 98 BPM | SYSTOLIC BLOOD PRESSURE: 121 MMHG | DIASTOLIC BLOOD PRESSURE: 77 MMHG | BODY MASS INDEX: 27.73 KG/M2 | WEIGHT: 142 LBS

## 2023-07-05 DIAGNOSIS — M25.562 POSTERIOR LEFT KNEE PAIN: Primary | ICD-10-CM

## 2023-07-05 PROBLEM — M79.605 LEG PAIN, POSTERIOR, LEFT: Status: RESOLVED | Noted: 2023-06-27 | Resolved: 2023-07-05

## 2023-07-05 PROCEDURE — 99214 OFFICE O/P EST MOD 30 MIN: CPT | Performed by: FAMILY MEDICINE

## 2023-07-05 PROCEDURE — 3725F SCREEN DEPRESSION PERFORMED: CPT | Performed by: FAMILY MEDICINE

## 2023-07-05 NOTE — ASSESSMENT & PLAN NOTE
Left posterior midline knee pain started on 7/23/23. Came in to office on 7/27/23. Prescribed voltaren gel, ice, and tylenol prn for pain. Reports improvement of pain today from 10/10 to 8/10 after using voltaren gel and ibuprofen. However, still unable to return to work. Has h/o of right popliteal cyst with pain that resolved after drainage back in 2022 with [facility]. Pt likely has a left-sided popliteal cyst given that he endorses current left posterior midline pain/pressure is very similar to the right one he had in 2022. Works in [employer] involving a lot of walking and climbing. Denies any trauma. Less likely to be DVT given negative hommans sign, no abdominal bruit on auscultation and no h/o smoking    Plan:   -c/w voltaren gel, tylenol, and ice prn for pain  -duplex scheduled for 7/6/202   -provided work note    Pt currently has duplex scheduled for tomorrow 7/6/2023.  Unfortun

## 2023-07-05 NOTE — ASSESSMENT & PLAN NOTE
Left posterior midline knee pain started on 7/23/23. Came in to office on 7/27/23. Prescribed voltaren gel, ice, and tylenol prn for pain. Reports improvement of pain today from 10/10 to 8/10 after using voltaren gel and ibuprofen. However, still unable to return to work. Has h/o of right popliteal cyst with pain that resolved after drainage back in 2022 with [facility]. Pt likely has a left-sided popliteal cyst given that he endorses current left posterior midline pain/pressure is very similar to the right one he had in 2022. Works in [employer] involving a lot of walking and climbing. Denies any trauma. Less likely to be DVT given negative hommans sign, no abdominal bruit on auscultation and no h/o smoking    Plan:   -c/w voltaren gel, tylenol, and ice prn for pain  -duplex scheduled for 7/6/2023; ultrasound scheduled for 7/28/2023--spoke with Maryam Dumont in attempt to move date forward, couldn't.   -pt self-scheduled with [facility] for appt on 7/7/2023.  May be able to get in-office ultrasound and treatment done there.  -provided work note  -follow-up after ultrasound and duplex completed or prn if imaging/treatment done with [facility]

## 2023-07-05 NOTE — LETTER
July 5, 2023     Patient: Alba Moreno  YOB: 1960  Date of Visit: 7/5/2023      To Whom it May Concern:    Sparkle Riley is under my professional care. Loc was seen in my office on 7/5/2023. Loc may return to work on 7/12/2023 . If you have any questions or concerns, please don't hesitate to call.          Sincerely,          Dylon French,         CC: No Recipients

## 2023-07-06 ENCOUNTER — HOSPITAL ENCOUNTER (OUTPATIENT)
Dept: NON INVASIVE DIAGNOSTICS | Facility: CLINIC | Age: 63
Discharge: HOME | End: 2023-07-06
Payer: COMMERCIAL

## 2023-07-06 DIAGNOSIS — M79.605 LEG PAIN, POSTERIOR, LEFT: ICD-10-CM

## 2023-07-06 PROCEDURE — 93971 EXTREMITY STUDY: CPT

## 2023-07-06 PROCEDURE — 93971 EXTREMITY STUDY: CPT | Performed by: SURGERY

## 2023-08-08 ENCOUNTER — RA CDI HCC (OUTPATIENT)
Dept: OTHER | Facility: HOSPITAL | Age: 63
End: 2023-08-08

## 2023-08-08 NOTE — PROGRESS NOTES
[address] [facility] coding opportunities       Chart reviewed, no opportunity found: CHART REVIEWED, NO OPPORTUNITY FOUND        Patients Insurance        Commercial Insurance: Yg Correa

## 2023-09-12 ENCOUNTER — OFFICE VISIT (OUTPATIENT)
Dept: FAMILY MEDICINE CLINIC | Facility: CLINIC | Age: 63
End: 2023-09-12

## 2023-09-12 VITALS
SYSTOLIC BLOOD PRESSURE: 127 MMHG | OXYGEN SATURATION: 98 % | BODY MASS INDEX: 26.81 KG/M2 | HEIGHT: 61 IN | HEART RATE: 69 BPM | DIASTOLIC BLOOD PRESSURE: 80 MMHG | TEMPERATURE: 98.4 F | WEIGHT: 142 LBS

## 2023-09-12 DIAGNOSIS — E78.2 MIXED HYPERLIPIDEMIA: ICD-10-CM

## 2023-09-12 DIAGNOSIS — Z12.11 ENCOUNTER FOR SCREENING COLONOSCOPY: ICD-10-CM

## 2023-09-12 DIAGNOSIS — Z00.00 ANNUAL PHYSICAL EXAM: Primary | ICD-10-CM

## 2023-09-12 PROCEDURE — 99396 PREV VISIT EST AGE 40-64: CPT | Performed by: FAMILY MEDICINE

## 2023-09-12 NOTE — PROGRESS NOTES
[facility]    NAME: Carlos Guan  AGE: 58 y.o. SEX: male  : 1960     DATE: 2023     Assessment and Plan:     Problem List Items Addressed This Visit        Other    Annual physical exam - Primary     - No acute concern  - Has history of Colon polyps require 3 years surveillance per Dr. Jovan Faustin  - Will send referral to GI with Dr. Jovan Faustin per pt's request  - He has history of smoking and quit > 20 years ago, no lung CT indicated; will need AAA screening next year          Encounter for screening colonoscopy    Relevant Orders    Ambulatory referral to Gastroenterology    BMI 27.0-27.9,adult     BMI Counseling: Body mass index is 27.01 kg/m². The BMI is above normal. Nutrition recommendations include reducing portion sizes, 3-5 servings of fruits/vegetables daily and reducing fast food intake. Exercise recommendations include vigorous aerobic physical activity for 75 minutes/week. Mixed hyperlipidemia    Relevant Orders    Lipid panel       Immunizations and preventive care screenings were discussed with patient today. Appropriate education was printed on patient's after visit summary. Discussed risks and benefits of prostate cancer screening. We discussed the controversial history of PSA screening for prostate cancer in the [facility] as well as the risk of over detection and over treatment of prostate cancer by way of PSA screening. The patient understands that PSA blood testing is an imperfect way to screen for prostate cancer and that elevated PSA levels in the blood may also be caused by infection, inflammation, prostatic trauma or manipulation, urological procedures, or by benign prostatic enlargement.     The role of the digital rectal examination in prostate cancer screening was also discussed and I discussed with him that there is large interobserver variability in the findings of digital rectal examination. Counseling:  · Exercise: the importance of regular exercise/physical activity was discussed. Recommend exercise 3-5 times per week for at least 30 minutes. Return in 1 year (on 9/12/2024). Chief Complaint:     Chief Complaint   Patient presents with   • Annual Exam      History of Present Illness:     Adult Annual Physical   Patient here for a comprehensive physical exam. The patient reports no problems. Diet and Physical Activity  · Diet/Nutrition: well balanced diet. · Exercise: no formal exercise. Depression Screening  PHQ-2/9 Depression Screening    Little interest or pleasure in doing things: 0 - not at all  Feeling down, depressed, or hopeless: 0 - not at all  PHQ-2 Score: 0  PHQ-2 Interpretation: Negative depression screen       General Health  · Sleep: gets 4-6 hours of sleep on average. · Hearing: normal - bilateral.  · Vision: no vision problems. · Dental: regular dental visits.  Health  · Symptoms include: none     Review of Systems:     Review of Systems   Constitutional: Negative for chills and fever. HENT: Negative for ear pain and sore throat. Eyes: Negative for pain and visual disturbance. Respiratory: Negative for cough and shortness of breath. Cardiovascular: Negative for chest pain and palpitations. Gastrointestinal: Negative for abdominal pain and vomiting. Genitourinary: Negative for dysuria and hematuria. Musculoskeletal: Negative for arthralgias and back pain. Skin: Negative for color change and rash. Neurological: Negative for seizures and syncope. All other systems reviewed and are negative. Past Medical History:     History reviewed. No pertinent past medical history.    Past Surgical History:     Past Surgical History:   Procedure Laterality Date   • FOOT SURGERY        Family History:     Family History   Problem Relation Age of Onset   • Pancreatic cancer Mother    • Liver disease Father         Chronic Social History:     Social History     Socioeconomic History   • Marital status: /Civil Union     Spouse name: None   • Number of children: None   • Years of education: None   • Highest education level: None   Occupational History   • None   Tobacco Use   • Smoking status: Former   • Smokeless tobacco: Never   Vaping Use   • Vaping Use: Never used   Substance and Sexual Activity   • Alcohol use: Never   • Drug use: Never   • Sexual activity: Not Currently     Partners: Female   Other Topics Concern   • None   Social History Narrative   • None     Social Determinants of Health     Financial Resource Strain: Low Risk  (7/5/2023)    Overall Financial Resource Strain (CARDIA)    • Difficulty of Paying Living Expenses: Not hard at all   Food Insecurity: No Food Insecurity (7/5/2023)    Hunger Vital Sign    • Worried About Running Out of Food in the Last Year: Never true    • Ran Out of Food in the Last Year: Never true   Transportation Needs: No Transportation Needs (6/27/2023)    PRAPARE - Transportation    • Lack of Transportation (Medical): No    • Lack of Transportation (Non-Medical):  No   Physical Activity: Inactive (7/5/2023)    Exercise Vital Sign    • Days of Exercise per Week: 0 days    • Minutes of Exercise per Session: 0 min   Stress: No Stress Concern Present (7/5/2023)    [facility]    • Feeling of Stress : Not at all   Social Connections: Not on file   Intimate Partner Violence: Not At Risk (7/5/2023)    Humiliation, Afraid, Rape, and Kick questionnaire    • Fear of Current or Ex-Partner: No    • Emotionally Abused: No    • Physically Abused: No    • Sexually Abused: No   Housing Stability: Low Risk  (7/5/2023)    Housing Stability Vital Sign    • Unable to Pay for Housing in the Last Year: No    • Number of Places Lived in the Last Year: 1    • Unstable Housing in the Last Year: No      Current Medications:     Current Outpatient Medications   Medication Sig Dispense Refill   • atorvastatin (LIPITOR) 20 mg tablet TAKE 1 TABLET DAILY 90 tablet 3   • Diclofenac Sodium (VOLTAREN) 1 % Apply 2 g topically 4 (four) times a day (Patient not taking: Reported on 9/12/2023) 50 g 0     No current facility-administered medications for this visit. Allergies:     No Known Allergies   Physical Exam:     /80 (BP Location: Right arm, Patient Position: Sitting, Cuff Size: Standard)   Pulse 69   Temp 98.4 °F (36.9 °C) (Temporal)   Ht 5' 0.8" (1.544 m)   Wt 64.4 kg (142 lb)   SpO2 98%   BMI 27.01 kg/m²     Physical Exam  Constitutional:       General: He is not in acute distress. Appearance: He is not toxic-appearing. HENT:      Head: Normocephalic and atraumatic. Right Ear: External ear normal.      Left Ear: External ear normal.      Nose: No congestion. Mouth/Throat:      Pharynx: No oropharyngeal exudate. Eyes:      General: No scleral icterus. Cardiovascular:      Rate and Rhythm: Normal rate and regular rhythm. Heart sounds: No murmur heard. No gallop. Pulmonary:      Effort: Pulmonary effort is normal. No respiratory distress. Abdominal:      General: Abdomen is flat. There is no distension. Palpations: Abdomen is soft. Musculoskeletal:         General: No swelling or deformity. Normal range of motion. Cervical back: No rigidity. Lymphadenopathy:      Cervical: No cervical adenopathy. Skin:     General: Skin is warm. Capillary Refill: Capillary refill takes less than 2 seconds. Coloration: Skin is not jaundiced. Findings: No bruising. Neurological:      General: No focal deficit present. Mental Status: He is alert and oriented to person, place, and time. Cranial Nerves: No cranial nerve deficit. Motor: No weakness.    Psychiatric:         Mood and Affect: Mood normal.         Behavior: Behavior normal.          DO Maddy JohnstonAncora Psychiatric Hospitalrad[facility]

## 2023-09-12 NOTE — ASSESSMENT & PLAN NOTE
- No acute concern  - Has history of Colon polyps require 3 years surveillance per Dr. Lamberto Brandon  - Will send referral to GI with Dr. Lamberto Brandon per pt's request  - He has history of smoking and quit > 20 years ago, no lung CT indicated; will need AAA screening next year

## 2023-09-12 NOTE — ASSESSMENT & PLAN NOTE
BMI Counseling: Body mass index is 27.01 kg/m². The BMI is above normal. Nutrition recommendations include reducing portion sizes, 3-5 servings of fruits/vegetables daily and reducing fast food intake. Exercise recommendations include vigorous aerobic physical activity for 75 minutes/week.

## 2023-09-17 LAB
CHOLEST SERPL-MCNC: 177 MG/DL
CHOLEST/HDLC SERPL: 3.3 (CALC)
HDLC SERPL-MCNC: 53 MG/DL
LDLC SERPL CALC-MCNC: 96 MG/DL (CALC)
NONHDLC SERPL-MCNC: 124 MG/DL (CALC)
TRIGL SERPL-MCNC: 188 MG/DL

## 2024-01-11 ENCOUNTER — OFFICE VISIT (OUTPATIENT)
Dept: FAMILY MEDICINE CLINIC | Facility: CLINIC | Age: 64
End: 2024-01-11

## 2024-01-11 VITALS
TEMPERATURE: 98.1 F | HEART RATE: 75 BPM | BODY MASS INDEX: 26.55 KG/M2 | DIASTOLIC BLOOD PRESSURE: 84 MMHG | HEIGHT: 61 IN | WEIGHT: 140.6 LBS | SYSTOLIC BLOOD PRESSURE: 144 MMHG

## 2024-01-11 DIAGNOSIS — M79.672 LEFT FOOT PAIN: Primary | ICD-10-CM

## 2024-01-11 PROCEDURE — 99213 OFFICE O/P EST LOW 20 MIN: CPT | Performed by: FAMILY MEDICINE

## 2024-01-11 NOTE — PROGRESS NOTES
Name: Yann Blackwell      : 1960      MRN: [id number]  Encounter Provider: Walker Marte DO  Encounter Date: 2024   Encounter department: [facility]    Assessment & Plan     1. Left foot pain  Assessment & Plan:  - Pain located at the ball of the big left toe; pt reported swelling and pain while walking   - No systemic symptoms reported   - No opening wound observed , no splinter seen   - Tdap updated last year in    Plan  - Likely muscle trauma from tight shoe box and flat foot  - Low likelihood of bacterial infection, hold off ABX   - Tylenol as needed, recommended wide toe shoe box   - Follow as needed              Subjective      HPI  63 y.o  presented to with c/o of left foot pain. Reported onset of 1 week, noticed swelling on the ball (Pl[place]ar side) of the big toe; no warmness appreciated, just local swelling underneath the left foot; no open wound, no splinter noticed. Pt has flat foot and walking on dress shoe daily. He denied febrile episode, red meat diet or other joint pain.   Review of Systems   Constitutional:  Negative for chills and fever.   HENT:  Negative for ear pain and sore throat.    Eyes:  Negative for pain and visual disturbance.   Respiratory:  Negative for cough and shortness of breath.    Cardiovascular:  Negative for chest pain and palpitations.   Gastrointestinal:  Negative for abdominal pain and vomiting.   Genitourinary:  Negative for dysuria and hematuria.   Musculoskeletal:  Negative for arthralgias, back pain and joint swelling.   Skin:  Negative for color change and rash.   Neurological:  Negative for seizures and syncope.   All other systems reviewed and are negative.      Current Outpatient Medications on File Prior to Visit   Medication Sig    atorvastatin (LIPITOR) 20 mg tablet TAKE 1 TABLET DAILY    Diclofenac Sodium (VOLTAREN) 1 % Apply 2 g topically 4 (four) times a day (Patient not taking: Reported on 2023)  "      Objective     /84   Pulse 75   Temp 98.1 °F (36.7 °C)   Ht 5' 0.6\" (1.539 m)   Wt 63.8 kg (140 lb 9.6 oz)   BMI 26.92 kg/m²     Physical Exam  Constitutional:       General: He is not in acute distress.     Appearance: He is not toxic-appearing.   HENT:      Head: Normocephalic and atraumatic.      Nose: No congestion.      Mouth/Throat:      Pharynx: No oropharyngeal exudate or posterior oropharyngeal erythema.   Eyes:      General: No scleral icterus.  Cardiovascular:      Rate and Rhythm: Normal rate and regular rhythm.      Heart sounds: Normal heart sounds. No murmur heard.  Pulmonary:      Effort: No respiratory distress.      Breath sounds: Normal breath sounds.   Abdominal:      General: There is no distension.      Palpations: Abdomen is soft.   Musculoskeletal:         General: No swelling.      Cervical back: No rigidity or tenderness.   Skin:     Capillary Refill: Capillary refill takes less than 2 seconds.      Coloration: Skin is not jaundiced.   Neurological:      General: No focal deficit present.      Mental Status: He is alert and oriented to person, place, and time.      Cranial Nerves: No cranial nerve deficit.      Motor: No weakness.   Psychiatric:         Mood and Affect: Mood normal.         Behavior: Behavior normal.       Tu IRA Marte, DO    "

## 2024-01-11 NOTE — ASSESSMENT & PLAN NOTE
- Pain located at the ball of the big left toe; pt reported swelling and pain while walking   - No systemic symptoms reported   - No opening wound observed , no splinter seen   - Tdap updated last year in 2022   Plan  - Likely muscle trauma from tight shoe box and flat foot  - Low likelihood of bacterial infection, hold off ABX   - Tylenol as needed, recommended wide toe shoe box   - Follow as needed

## 2024-02-21 PROBLEM — Z13.6 SCREENING FOR CARDIOVASCULAR CONDITION: Status: RESOLVED | Noted: 2019-07-02 | Resolved: 2024-02-21

## 2024-02-21 PROBLEM — Z00.00 HEALTHCARE MAINTENANCE: Status: RESOLVED | Noted: 2019-07-02 | Resolved: 2024-02-21

## 2024-02-21 PROBLEM — Z12.5 PROSTATE CANCER SCREENING: Status: RESOLVED | Noted: 2019-07-02 | Resolved: 2024-02-21

## 2024-02-21 PROBLEM — Z12.11 SCREENING FOR COLON CANCER: Status: RESOLVED | Noted: 2019-07-02 | Resolved: 2024-02-21

## 2024-07-30 ENCOUNTER — OFFICE VISIT (OUTPATIENT)
Dept: INTERNAL MEDICINE CLINIC | Facility: CLINIC | Age: 64
End: 2024-07-30
Payer: COMMERCIAL

## 2024-07-30 VITALS
DIASTOLIC BLOOD PRESSURE: 80 MMHG | HEART RATE: 75 BPM | BODY MASS INDEX: 26.85 KG/M2 | OXYGEN SATURATION: 99 % | SYSTOLIC BLOOD PRESSURE: 130 MMHG | WEIGHT: 142.2 LBS | HEIGHT: 61 IN

## 2024-07-30 DIAGNOSIS — E55.9 VITAMIN D DEFICIENCY: ICD-10-CM

## 2024-07-30 DIAGNOSIS — Z12.5 SCREENING FOR PROSTATE CANCER: ICD-10-CM

## 2024-07-30 DIAGNOSIS — E78.00 HIGH CHOLESTEROL: ICD-10-CM

## 2024-07-30 DIAGNOSIS — Z00.00 ANNUAL PHYSICAL EXAM: Primary | ICD-10-CM

## 2024-07-30 DIAGNOSIS — E78.2 MIXED HYPERLIPIDEMIA: ICD-10-CM

## 2024-07-30 DIAGNOSIS — R73.01 IMPAIRED FASTING GLUCOSE: ICD-10-CM

## 2024-07-30 DIAGNOSIS — Z23 ENCOUNTER FOR IMMUNIZATION: ICD-10-CM

## 2024-07-30 PROCEDURE — 99397 PER PM REEVAL EST PAT 65+ YR: CPT | Performed by: INTERNAL MEDICINE

## 2024-07-30 PROCEDURE — 1101F PT FALLS ASSESS-DOCD LE1/YR: CPT | Performed by: INTERNAL MEDICINE

## 2024-07-30 PROCEDURE — 3288F FALL RISK ASSESSMENT DOCD: CPT | Performed by: INTERNAL MEDICINE

## 2024-07-30 PROCEDURE — 3725F SCREEN DEPRESSION PERFORMED: CPT | Performed by: INTERNAL MEDICINE

## 2024-07-30 PROCEDURE — 90471 IMMUNIZATION ADMIN: CPT

## 2024-07-30 PROCEDURE — 90677 PCV20 VACCINE IM: CPT

## 2024-07-30 RX ORDER — ATORVASTATIN CALCIUM 20 MG/1
20 TABLET, FILM COATED ORAL DAILY
Qty: 100 TABLET | Refills: 3 | Status: SHIPPED | OUTPATIENT
Start: 2024-07-30

## 2024-07-30 NOTE — ASSESSMENT & PLAN NOTE
Discussed preventative health, cancer screening, immunizations, and safety issues.  Patient reports having a colonoscopy with Dr. Jenkins last year, the most recent one I can find is November 2, 2020, will to track down the other colonoscopy if it was done.  I recommend yearly flu shot.  Patient had Tdap vaccination 2/2/2022.  I recommend Prevnar 20.    I recommend getting the Shingrix shot to help prevent Shingles.  You can get it a pharmacy, and they can administer it there.  It is a two shot series with the second shot needed between 2-6 months after the first shot.  I would not recommend getting the shot before an important or fun event in case you were to have a reaction to the shot like a sore arm or flu-like symptoms.  I make the same recommendation about any shot, as people can have a reaction to any shot.

## 2024-07-30 NOTE — PATIENT INSTRUCTIONS
Problem List Items Addressed This Visit          Other    Annual physical exam - Primary     Discussed preventative health, cancer screening, immunizations, and safety issues.  Patient reports having a colonoscopy with Dr. Jenkins last year, the most recent one I can find is November 2, 2020, will to track down the other colonoscopy if it was done.  I recommend yearly flu shot.  Patient had Tdap vaccination 2/2/2022.  I recommend Prevnar 20.    I recommend getting the Shingrix shot to help prevent Shingles.  You can get it a pharmacy, and they can administer it there.  It is a two shot series with the second shot needed between 2-6 months after the first shot.  I would not recommend getting the shot before an important or fun event in case you were to have a reaction to the shot like a sore arm or flu-like symptoms.  I make the same recommendation about any shot, as people can have a reaction to any shot.         Mixed hyperlipidemia     Continue atorvastatin along with healthy diet and exercise         Relevant Medications    atorvastatin (LIPITOR) 20 mg tablet    Other Relevant Orders    CBC and differential    Comprehensive metabolic panel    Lipid Panel with Direct LDL reflex    TSH, 3rd generation with Free T4 reflex     Other Visit Diagnoses       Encounter for immunization        Relevant Orders    Pneumococcal Conjugate Vaccine 20-valent (Pcv20)    High cholesterol        Relevant Medications    atorvastatin (LIPITOR) 20 mg tablet    Impaired fasting glucose        Relevant Orders    Hemoglobin A1C    Vitamin D deficiency        Relevant Orders    Vitamin D 25 hydroxy    Screening for prostate cancer        Relevant Orders    PSA, Total Screen

## 2024-07-30 NOTE — PROGRESS NOTES
Ambulatory Visit  Name: Yann Blackwell      : 1960      MRN: [id number]  Encounter Provider: Selvin Miller MD  Encounter Date: 2024   Enc[facility]    Assessment & Plan   1. Annual physical exam  Assessment & Plan:  Discussed preventative health, cancer screening, immunizations, and safety issues.  Patient reports having a colonoscopy with Dr. Jenkins last year, the most recent one I can find is 2020, will to track down the other colonoscopy if it was done.  I recommend yearly flu shot.  Patient had Tdap vaccination 2022.  I recommend Prevnar 20.    I recommend getting the Shingrix shot to help prevent Shingles.  You can get it a pharmacy, and they can administer it there.  It is a two shot series with the second shot needed between 2-6 months after the first shot.  I would not recommend getting the shot before an important or fun event in case you were to have a reaction to the shot like a sore arm or flu-like symptoms.  I make the same recommendation about any shot, as people can have a reaction to any shot.  2. Encounter for immunization  -     Pneumococcal Conjugate Vaccine 20-valent (Pcv20)  3. Mixed hyperlipidemia  Assessment & Plan:  Continue atorvastatin along with healthy diet and exercise  Orders:  -     CBC and differential; Future  -     Comprehensive metabolic panel; Future  -     Lipid Panel with Direct LDL reflex; Future  -     TSH, 3rd generation with Free T4 reflex; Future  4. High cholesterol  -     atorvastatin (LIPITOR) 20 mg tablet; Take 1 tablet (20 mg total) by mouth daily  5. Impaired fasting glucose  -     Hemoglobin A1C; Future  6. Vitamin D deficiency  -     Vitamin D 25 hydroxy; Future  7. Screening for prostate cancer  -     PSA, Total Screen; Future         History of Present Illness     Patient here to establish care and requests a wellness.  Patient gets routine dental care, no smoking cigarettes, eats a healthy diet,  exercises.  Patient on atorvastatin for hypercholesterolemia      Review of Systems   Constitutional:  Negative for chills, fatigue and fever.   HENT:  Negative for congestion, nosebleeds, postnasal drip, sore throat and trouble swallowing.    Eyes:  Negative for pain.   Respiratory:  Negative for cough, chest tightness, shortness of breath and wheezing.    Cardiovascular:  Negative for chest pain, palpitations and leg swelling.   Gastrointestinal:  Negative for abdominal pain, constipation, diarrhea, nausea and vomiting.   Endocrine: Negative for polydipsia and polyuria.   Genitourinary:  Negative for dysuria, flank pain and hematuria.   Musculoskeletal:  Negative for arthralgias, back pain and myalgias.   Skin:  Negative for rash.   Neurological:  Negative for dizziness, tremors, light-headedness and headaches.   Hematological:  Does not bruise/bleed easily.   Psychiatric/Behavioral:  Negative for confusion and dysphoric mood. The patient is not nervous/anxious.      History reviewed. No pertinent past medical history.  Past Surgical History:   Procedure Laterality Date   • FOOT SURGERY       Family History   Problem Relation Age of Onset   • Pancreatic cancer Mother    • Liver disease Father         Chronic      Social History     Tobacco Use   • Smoking status: Former   • Smokeless tobacco: Never   Vaping Use   • Vaping status: Never Used   Substance and Sexual Activity   • Alcohol use: Never   • Drug use: Never   • Sexual activity: Not Currently     Partners: Female     Current Outpatient Medications on File Prior to Visit   Medication Sig   • [DISCONTINUED] atorvastatin (LIPITOR) 20 mg tablet TAKE 1 TABLET DAILY   • Diclofenac Sodium (VOLTAREN) 1 % Apply 2 g topically 4 (four) times a day (Patient not taking: Reported on 9/12/2023)     No Known Allergies  Immunization History   Administered Date(s) Administered   • COVID-19 PFIZER VACCINE 0.3 ML IM 04/22/2021, 05/20/2021   • INFLUENZA 11/15/2014, 10/25/2018,  "10/06/2021   • Influenza Injectable, MDCK, Preservative Free, Quadrivalent, 0.5 mL 10/28/2018   • Tdap 09/24/2010, 02/02/2022     Objective     /80   Pulse 75   Ht 5' 0.6\" (1.539 m)   Wt 64.5 kg (142 lb 3.2 oz)   SpO2 99%   BMI 27.22 kg/m²     Physical Exam  Vitals reviewed.   Constitutional:       General: He is not in acute distress.     Appearance: Normal appearance. He is well-developed.   HENT:      Head: Normocephalic and atraumatic.      Right Ear: External ear normal.      Left Ear: External ear normal.      Nose: Nose normal.   Eyes:      General: No scleral icterus.     Conjunctiva/sclera: Conjunctivae normal.   Neck:      Trachea: No tracheal deviation.   Cardiovascular:      Rate and Rhythm: Normal rate and regular rhythm.      Heart sounds: Normal heart sounds. No murmur heard.  Pulmonary:      Effort: Pulmonary effort is normal. No respiratory distress.      Breath sounds: Normal breath sounds. No wheezing or rales.   Abdominal:      General: Bowel sounds are normal.      Palpations: Abdomen is soft. There is no mass.      Tenderness: There is no abdominal tenderness. There is no guarding.      Hernia: There is no hernia in the left inguinal area or right inguinal area.   Genitourinary:     Testes: Normal.   Musculoskeletal:      Cervical back: Normal range of motion and neck supple.      Right lower leg: No edema.      Left lower leg: No edema.   Lymphadenopathy:      Cervical: No cervical adenopathy.   Neurological:      General: No focal deficit present.      Mental Status: He is alert and oriented to person, place, and time.   Psychiatric:         Mood and Affect: Mood normal.         Behavior: Behavior normal.         Thought Content: Thought content normal.         Judgment: Judgment normal.         "

## 2024-08-01 ENCOUNTER — TELEPHONE (OUTPATIENT)
Dept: ADMINISTRATIVE | Facility: OTHER | Age: 64
End: 2024-08-01

## 2024-08-01 NOTE — TELEPHONE ENCOUNTER
Upon review of the In Basket request we were able to locate, review, and update the patient chart as requested for CRC: Colonoscopy. 12-27-23 with a 3 year recall. Due 2026 as per office no 2024.    Any additional questions or concerns should be emailed to the Practice Liaisons via the appropriate education email address, please do not reply via In Basket.    Thank you  Lora Nolasco MA   [facility]

## 2024-08-01 NOTE — TELEPHONE ENCOUNTER
----- Message from Alisha REDD sent at 8/1/2024  7:48 AM EDT -----  Regarding: care gap request/colonoscopy  08/01/24 7:48 AM    Hello, our patient attached above has had CRC: Colonoscopy completed/performed. Please assist in updating the patient chart by pulling the document from the Media Tab. The date of service is 12/27/2023.     Thank you,  Alisha Pyle  [facility] [place]

## 2024-08-01 NOTE — TELEPHONE ENCOUNTER
----- Message from Beth SOW sent at 8/1/2024  7:47 AM EDT -----  Regarding: Care Gap Request  08/01/24 7:47 AM    Hello, our patient above has had CRC: Colonoscopy completed/performed. Please assist in updating the patient chart by pulling the document from the Media Tab. The date of service is 7/31/2024.     Thank you,  Beth Lee MA  [facility] [place]

## 2024-08-12 LAB
25(OH)D3 SERPL-MCNC: 29 NG/ML (ref 30–100)
ALBUMIN SERPL-MCNC: 4.5 G/DL (ref 3.6–5.1)
ALBUMIN/GLOB SERPL: 1.7 (CALC) (ref 1–2.5)
ALP SERPL-CCNC: 56 U/L (ref 35–144)
ALT SERPL-CCNC: 29 U/L (ref 9–46)
AST SERPL-CCNC: 28 U/L (ref 10–35)
BASOPHILS # BLD AUTO: 69 CELLS/UL (ref 0–200)
BASOPHILS NFR BLD AUTO: 1.1 %
BILIRUB SERPL-MCNC: 0.5 MG/DL (ref 0.2–1.2)
BUN SERPL-MCNC: 18 MG/DL (ref 7–25)
BUN/CREAT SERPL: NORMAL (CALC) (ref 6–22)
CALCIUM SERPL-MCNC: 9.7 MG/DL (ref 8.6–10.3)
CHLORIDE SERPL-SCNC: 103 MMOL/L (ref 98–110)
CHOLEST SERPL-MCNC: 171 MG/DL
CHOLEST/HDLC SERPL: 3.2 (CALC)
CO2 SERPL-SCNC: 29 MMOL/L (ref 20–32)
CREAT SERPL-MCNC: 1.11 MG/DL (ref 0.7–1.35)
EOSINOPHIL # BLD AUTO: 107 CELLS/UL (ref 15–500)
EOSINOPHIL NFR BLD AUTO: 1.7 %
ERYTHROCYTE [DISTWIDTH] IN BLOOD BY AUTOMATED COUNT: 12.4 % (ref 11–15)
GFR/BSA.PRED SERPLBLD CYS-BASED-ARV: 74 ML/MIN/1.73M2
GLOBULIN SER CALC-MCNC: 2.6 G/DL (CALC) (ref 1.9–3.7)
GLUCOSE SERPL-MCNC: 99 MG/DL (ref 65–99)
HBA1C MFR BLD: 5.8 % OF TOTAL HGB
HCT VFR BLD AUTO: 45.8 % (ref 38.5–50)
HDLC SERPL-MCNC: 53 MG/DL
HGB BLD-MCNC: 14.8 G/DL (ref 13.2–17.1)
LDLC SERPL CALC-MCNC: 93 MG/DL (CALC)
LYMPHOCYTES # BLD AUTO: 1966 CELLS/UL (ref 850–3900)
LYMPHOCYTES NFR BLD AUTO: 31.2 %
MCH RBC QN AUTO: 30.6 PG (ref 27–33)
MCHC RBC AUTO-ENTMCNC: 32.3 G/DL (ref 32–36)
MCV RBC AUTO: 94.6 FL (ref 80–100)
MONOCYTES # BLD AUTO: 510 CELLS/UL (ref 200–950)
MONOCYTES NFR BLD AUTO: 8.1 %
NEUTROPHILS # BLD AUTO: 3648 CELLS/UL (ref 1500–7800)
NEUTROPHILS NFR BLD AUTO: 57.9 %
NONHDLC SERPL-MCNC: 118 MG/DL (CALC)
PLATELET # BLD AUTO: 196 THOUSAND/UL (ref 140–400)
PMV BLD REES-ECKER: 11 FL (ref 7.5–12.5)
POTASSIUM SERPL-SCNC: 4.7 MMOL/L (ref 3.5–5.3)
PROT SERPL-MCNC: 7.1 G/DL (ref 6.1–8.1)
PSA SERPL-MCNC: 1.9 NG/ML
RBC # BLD AUTO: 4.84 MILLION/UL (ref 4.2–5.8)
SERVICE CMNT-IMP: NORMAL
SODIUM SERPL-SCNC: 141 MMOL/L (ref 135–146)
T4 FREE SERPL-MCNC: 1 NG/DL (ref 0.8–1.8)
TRIGL SERPL-MCNC: 155 MG/DL
TSH SERPL-ACNC: 5.45 MIU/L (ref 0.4–4.5)
WBC # BLD AUTO: 6.3 THOUSAND/UL (ref 3.8–10.8)

## 2024-08-30 ENCOUNTER — TELEPHONE (OUTPATIENT)
Age: 64
End: 2024-08-30

## 2024-08-30 DIAGNOSIS — E78.00 HIGH CHOLESTEROL: ICD-10-CM

## 2024-08-30 RX ORDER — ATORVASTATIN CALCIUM 10 MG/1
10 TABLET, FILM COATED ORAL DAILY
Qty: 100 TABLET | Refills: 3 | Status: SHIPPED | OUTPATIENT
Start: 2024-08-30

## 2024-08-30 NOTE — TELEPHONE ENCOUNTER
Patient is calling to ask Dr. Miller if he can lower his Lipitor prescription to 10mg instead of 20mg.    I asked if he was having side effects but he just stated he would feel better with 10mg    Please call patient back. Thank you

## 2025-01-14 ENCOUNTER — RA CDI HCC (OUTPATIENT)
Dept: OTHER | Facility: HOSPITAL | Age: 65
End: 2025-01-14

## 2025-01-14 PROBLEM — Z00.00 ANNUAL PHYSICAL EXAM: Status: RESOLVED | Noted: 2023-09-12 | Resolved: 2025-01-14

## 2025-01-14 PROBLEM — Z12.11 ENCOUNTER FOR SCREENING COLONOSCOPY: Status: RESOLVED | Noted: 2023-09-12 | Resolved: 2025-01-14

## 2025-01-14 NOTE — PROGRESS NOTES
HCC coding opportunities       Chart reviewed, no opportunity found: CHART REVIEWED, NO OPPORTUNITY FOUND      This is a reminder to address (resolve/update/assess) ALL HCC (risk adjustment) codes as found on active problem list for 2025 as patient scores reset to zero GIBRAN.  Patients Insurance        Commercial Insurance: [employer]

## 2025-01-21 ENCOUNTER — CONSULT (OUTPATIENT)
Dept: INTERNAL MEDICINE CLINIC | Facility: CLINIC | Age: 65
End: 2025-01-21
Payer: COMMERCIAL

## 2025-01-21 VITALS
DIASTOLIC BLOOD PRESSURE: 78 MMHG | BODY MASS INDEX: 26.06 KG/M2 | HEART RATE: 80 BPM | WEIGHT: 138 LBS | SYSTOLIC BLOOD PRESSURE: 142 MMHG | HEIGHT: 61 IN | OXYGEN SATURATION: 100 %

## 2025-01-21 DIAGNOSIS — Z01.818 PREOP EXAMINATION: Primary | ICD-10-CM

## 2025-01-21 DIAGNOSIS — M20.42 HAMMER TOE OF LEFT FOOT: ICD-10-CM

## 2025-01-21 DIAGNOSIS — E78.00 HIGH CHOLESTEROL: ICD-10-CM

## 2025-01-21 DIAGNOSIS — E78.2 MIXED HYPERLIPIDEMIA: ICD-10-CM

## 2025-01-21 PROCEDURE — 99213 OFFICE O/P EST LOW 20 MIN: CPT

## 2025-01-21 RX ORDER — ATORVASTATIN CALCIUM 10 MG/1
10 TABLET, FILM COATED ORAL DAILY
Qty: 100 TABLET | Refills: 3 | Status: SHIPPED | OUTPATIENT
Start: 2025-01-21

## 2025-01-21 NOTE — ASSESSMENT & PLAN NOTE
Patient with history of hyperlipidemia   Lab Results   Component Value Date    CHOLESTEROL 171 08/10/2024    TRIG 155 (H) 08/10/2024    HDL 53 08/10/2024    LDLCALC 93 08/10/2024   Atorvastatin 40 mg daily. Well controlled. Can continue with current regimen

## 2025-01-21 NOTE — ASSESSMENT & PLAN NOTE
Presented to the clinic for preop evlauation for her upcoming corrective for hammer toe of his left foot.  Surgery will be on 02/11/2024.    Since patient's last visit in 7/2024, he has not had any illness, any changes in sleep, diet or appetite. Denies smoking and drinking.   Patient will undergo  under general anesthesia. Patient will be accompanied by spouse  post surgery.  Patient is able to climb a flight of stairs with no shortness of breath and denies any difficulty breathing. METS 4.  Patient's last labs, imaging and EKG reviewed. Vital signs stable. Physical exam today is unremarkable.    Patient is currently medically optimized and she is at standard risk for surgery.    Assessment of Cardiac Risk:  Denies unstable or severe angina or MI in the last 6 weeks or history of stent placement in the last year   Denies decompensated heart failure (e.g. New onset heart failure, NYHA functional class IV heart failure, or worsening existing heart failure)  Denies significant arrhythmias such as high grade AV block, symptomatic ventricular arrhythmia, newly recognized ventricular tachycardia, supraventricular tachycardia with resting heart rate >100, or symptomatic bradycardia  Denies severe heart valve disease including aortic stenosis or symptomatic mitral stenosis

## 2025-01-21 NOTE — PROGRESS NOTES
Presurgical Evaluation    Subjective:      Patient ID: Yann Blackwell is a 64 y.o. male.    Chief Complaint   Patient presents with    Pre-op Exam       Mr. Blackwell is a 64 year old male with PMH of hyperlipidemia who presents to the office for pre-op exam. Reports no new compliants since his last office visit. He endorse that for the past 1 year, he's had a left  foot pain from a  hammer toe. He reports difficulties walking and wearing shoes because of his toe abnormalities.    Review of Systems   Constitutional:  Negative for chills and fever.   HENT:  Negative for postnasal drip, sinus pain, sore throat and voice change.    Cardiovascular:  Negative for chest pain, palpitations and leg swelling.   Gastrointestinal:  Negative for blood in stool, diarrhea, nausea and vomiting.   Endocrine: Negative for cold intolerance and heat intolerance.   Genitourinary: Negative.    Musculoskeletal:  Negative for arthralgias and back pain.   Neurological: Negative.    Psychiatric/Behavioral: Negative.       The following portions of the patient's history were reviewed and updated as appropriate: He  has a past medical history of Annual physical exam (09/12/2023) and Encounter for screening colonoscopy (09/12/2023).  He   Patient Active Problem List    Diagnosis Date Noted    Preop examination 01/21/2025    BMI 27.0-27.9,adult 09/12/2023    Posterior left knee pain 07/05/2023    Hammer toe of left foot 06/27/2023    Sterilization consult 06/29/2021    Acute pain of right knee 07/02/2019    Mixed hyperlipidemia 07/02/2019       Current Outpatient Medications   Medication Sig Dispense Refill    atorvastatin (LIPITOR) 10 mg tablet Take 1 tablet (10 mg total) by mouth daily 100 tablet 3     No current facility-administered medications for this visit.   .    Procedure date: February 11, 2025     Surgeon:  Dr. Cleveland Jaffe  Planned procedure:  Left foot Hammertoe procedure   Diagnosis for procedure:  Hammer toe    Prior anesthesia: Yes   " Local; Complications:  None / Tolerated well    CAD History: None   NOTE: Patient should see Cardiology if time available before surgery, and if appropriate.    Pulmonary History: None    Renal history: None    Diabetes History:  None     Neurological History: None     On Immunosuppressant meds/biologics: No    Current Outpatient Medications   Medication Sig Dispense Refill    atorvastatin (LIPITOR) 10 mg tablet Take 1 tablet (10 mg total) by mouth daily 100 tablet 3     No current facility-administered medications for this visit.       Allergies on file:   Patient has no known allergies.    Patient Active Problem List   Diagnosis    Acute pain of right knee    Sterilization consult    Hammer toe of left foot    Posterior left knee pain    BMI 27.0-27.9,adult    Mixed hyperlipidemia    Preop examination        Past Medical History:   Diagnosis Date    Annual physical exam 09/12/2023    Encounter for screening colonoscopy 09/12/2023       Past Surgical History:   Procedure Laterality Date    FOOT SURGERY         Family History   Problem Relation Age of Onset    Pancreatic cancer Mother     Liver disease Father         Chronic        Social History     Tobacco Use    Smoking status: Former    Smokeless tobacco: Never   Vaping Use    Vaping status: Never Used   Substance Use Topics    Alcohol use: Never    Drug use: Never       Objective:    Vitals:    01/21/25 1527   BP: 142/78   BP Location: Right arm   Patient Position: Sitting   Cuff Size: Standard   Pulse: 80   SpO2: 100%   Weight: 62.6 kg (138 lb)   Height: 5' 0.6\" (1.539 m)        Physical Exam  Vitals and nursing note reviewed.   Constitutional:       Appearance: Normal appearance.   HENT:      Head: Normocephalic and atraumatic.      Right Ear: Tympanic membrane normal. There is no impacted cerumen.      Left Ear: Tympanic membrane normal. There is no impacted cerumen.      Mouth/Throat:      Mouth: Mucous membranes are moist.      Pharynx: No oropharyngeal " exudate or posterior oropharyngeal erythema.   Eyes:      Extraocular Movements: Extraocular movements intact.      Pupils: Pupils are equal, round, and reactive to light.   Cardiovascular:      Rate and Rhythm: Normal rate and regular rhythm.      Pulses: Normal pulses.      Heart sounds: Normal heart sounds.   Pulmonary:      Effort: Pulmonary effort is normal.      Breath sounds: Normal breath sounds.   Abdominal:      General: Abdomen is flat. Bowel sounds are normal.      Palpations: Abdomen is soft.   Musculoskeletal:         General: Normal range of motion.      Right lower leg: No edema.      Left lower leg: No edema.   Skin:     General: Skin is warm and dry.   Neurological:      Mental Status: He is alert and oriented to person, place, and time.   Psychiatric:         Mood and Affect: Mood normal.         Behavior: Behavior normal.           Preop labs/testing available and reviewed: no               EKG no    Echo no    Stress test/cath no    PFT/Jose Alberto no    Functional capacity: Shovel snow                          6 Mets   Pick the highest level patient can comfortably perform   4 mets or greater for surgery    RCRI  High Risk surgery?         1 Point  CAD History:         1 Point   MI; Positive Stress Test; CP due to Mi;  Nitrate Usage to control Angina; Pathologic Q wave on EKG  CHF Active:         1 Point   Pulm Edema; Paroxysmal Nocturnal Dyspnea;  Bibasilar Rales (crackles);S3; CHF on CXR  Cerebrovascular Disease (TIA or CVA):     1 Point  DM on Insulin:        1 Point  Serum Creat >2.0 mg/dl:       1 Point          Total Points: 6     Scorin: Class I, Very Low Risk (0.4%)     1: Class II, Low risk (0.9%)     2: Class III Moderate (6.6%)     3: Class IV High (>11%)      AHMET Risk:  GFR:        For PCP:  If GFR>60, Hold ACE/ARB/Diuretic on the day of surgery, and NSAIDS 10 days before.    If GFR<45, Consider PRE and POST op Nephrology Consult.    If 46 <GFR> 59 : Has Patient had AHMET in last 6  Months? no   If YES: Preop Nephrology consult   If No:  Post Op Nephrology consult.           Assessment/Plan:    Patient is medically optimized (cleared) for the planned procedure.    Further testing/evaluation is not required.    Postop concerns: No    Problem List Items Addressed This Visit       Hammer toe of left foot        Presented to the clinic for preop evlauation for her upcoming corrective for hammer toe of his left foot.  Surgery will be on 02/11/2024.    Since patient's last visit in 7/2024, he has not had any illness, any changes in sleep, diet or appetite. Denies smoking and drinking.   Patient will undergo  under general anesthesia. Patient will be accompanied by spouse  post surgery.  Patient is able to climb a flight of stairs with no shortness of breath and denies any difficulty breathing. METS 4.  Patient's last labs, imaging and EKG reviewed. Vital signs stable. Physical exam today is unremarkable.    Patient is currently medically optimized and she is at standard risk for surgery.    Assessment of Cardiac Risk:  Denies unstable or severe angina or MI in the last 6 weeks or history of stent placement in the last year   Denies decompensated heart failure (e.g. New onset heart failure, NYHA functional class IV heart failure, or worsening existing heart failure)  Denies significant arrhythmias such as high grade AV block, symptomatic ventricular arrhythmia, newly recognized ventricular tachycardia, supraventricular tachycardia with resting heart rate >100, or symptomatic bradycardia  Denies severe heart valve disease including aortic stenosis or symptomatic mitral stenosis          Mixed hyperlipidemia    Patient with history of hyperlipidemia   Lab Results   Component Value Date    CHOLESTEROL 171 08/10/2024    TRIG 155 (H) 08/10/2024    HDL 53 08/10/2024    LDLCALC 93 08/10/2024   Atorvastatin 40 mg daily. Well controlled. Can continue with current regimen           Relevant Medications     atorvastatin (LIPITOR) 10 mg tablet    Preop examination - Primary     Other Visit Diagnoses         High cholesterol        Relevant Medications    atorvastatin (LIPITOR) 10 mg tablet               Problem List Items Addressed This Visit       Hammer toe of left foot        Presented to the clinic for preop evlauation for her upcoming corrective for hammer toe of his left foot.  Surgery will be on 02/11/2024.    Since patient's last visit in 7/2024, he has not had any illness, any changes in sleep, diet or appetite. Denies smoking and drinking.   Patient will undergo  under general anesthesia. Patient will be accompanied by spouse  post surgery.  Patient is able to climb a flight of stairs with no shortness of breath and denies any difficulty breathing. METS 4.  Patient's last labs, imaging and EKG reviewed. Vital signs stable. Physical exam today is unremarkable.    Patient is currently medically optimized and she is at standard risk for surgery.    Assessment of Cardiac Risk:  Denies unstable or severe angina or MI in the last 6 weeks or history of stent placement in the last year   Denies decompensated heart failure (e.g. New onset heart failure, NYHA functional class IV heart failure, or worsening existing heart failure)  Denies significant arrhythmias such as high grade AV block, symptomatic ventricular arrhythmia, newly recognized ventricular tachycardia, supraventricular tachycardia with resting heart rate >100, or symptomatic bradycardia  Denies severe heart valve disease including aortic stenosis or symptomatic mitral stenosis          Mixed hyperlipidemia    Patient with history of hyperlipidemia   Lab Results   Component Value Date    CHOLESTEROL 171 08/10/2024    TRIG 155 (H) 08/10/2024    HDL 53 08/10/2024    LDLCALC 93 08/10/2024   Atorvastatin 40 mg daily. Well controlled. Can continue with current regimen           Relevant Medications    atorvastatin (LIPITOR) 10 mg tablet    Preop examination -  "Primary     Other Visit Diagnoses         High cholesterol        Relevant Medications    atorvastatin (LIPITOR) 10 mg tablet              Pre-Surgery Instructions:   Medication Instructions    atorvastatin (LIPITOR) 10 mg tablet Take morning of surgery        NOTE: Please use the above to review important meds for your specialty, the remainder \"per anesthesia Guidelines.\"    NOTE: Please place an Inbasket message for \"SOC\" pool for complicated patients.       "

## 2025-01-27 ENCOUNTER — PREP FOR PROCEDURE (OUTPATIENT)
Dept: OBGYN CLINIC | Facility: OTHER | Age: 65
End: 2025-01-27

## 2025-01-27 DIAGNOSIS — M20.42 HAMMER TOE OF LEFT FOOT: Primary | ICD-10-CM

## 2025-01-31 NOTE — PRE-PROCEDURE INSTRUCTIONS
Pre-Surgery Instructions:   Medication Instructions    atorvastatin (LIPITOR) 10 mg tablet Take day of surgery.   Medication instructions for day surgery reviewed. Please use only a sip of water to take your instructed medications. Avoid all over the counter vitamins, supplements and NSAIDS for one week prior to surgery per anesthesia guidelines. Tylenol is ok to take as needed.     You will receive a call one business day prior to surgery with an arrival time and hospital directions. If your surgery is scheduled on a Monday, the hospital will be calling you on the Friday prior to your surgery. If you have not heard from anyone by 8pm, please call the hospital supervisor through the hospital  at [phone]. ([place] [phone] or [place] [phone]).    Do not eat or drink anything after midnight the night before your surgery, including candy, mints, lifesavers, or chewing gum. Do not drink alcohol 24hrs before your surgery. Try not to smoke at least 24hrs before your surgery.       Follow the pre surgery showering instructions as listed in the “My Surgical Experience Booklet” or otherwise provided by your surgeon's office. Do not use a blade to shave the surgical area 1 week before surgery. It is okay to use a clean electric clippers up to 24 hours before surgery. Do not apply any lotions, creams, including makeup, cologne, deodorant, or perfumes after showering on the day of your surgery. Do not use dry shampoo, hair spray, hair gel, or any type of hair products.     No contact lenses, eye make-up, or artificial eyelashes. Remove nail polish, including gel polish, and any artificial, gel, or acrylic nails if possible. Remove all jewelry including rings and body piercing jewelry.     Wear causal clothing that is easy to take on and off. Consider your type of surgery.    Keep any valuables, jewelry, piercings at home. Please bring any specially ordered equipment (sling, braces) if  indicated.    Arrange for a responsible person to drive you to and from the hospital on the day of your surgery. Please confirm the visitor policy for the day of your procedure when you receive your phone call with an arrival time.     Call the surgeon's office with any new illnesses, exposures, or additional questions prior to surgery.    Please reference your “My Surgical Experience Booklet” for additional information to prepare for your upcoming surgery.

## 2025-02-09 ENCOUNTER — ANESTHESIA EVENT (OUTPATIENT)
Dept: PERIOP | Facility: HOSPITAL | Age: 65
End: 2025-02-09
Payer: COMMERCIAL

## 2025-02-11 ENCOUNTER — HOSPITAL ENCOUNTER (OUTPATIENT)
Facility: HOSPITAL | Age: 65
Setting detail: OUTPATIENT SURGERY
Discharge: HOME | End: 2025-02-11
Attending: PODIATRIST | Admitting: PODIATRIST
Payer: COMMERCIAL

## 2025-02-11 ENCOUNTER — ANESTHESIA (OUTPATIENT)
Dept: PERIOP | Facility: HOSPITAL | Age: 65
End: 2025-02-11
Payer: COMMERCIAL

## 2025-02-11 ENCOUNTER — APPOINTMENT (OUTPATIENT)
Dept: RADIOLOGY | Facility: HOSPITAL | Age: 65
End: 2025-02-11
Payer: COMMERCIAL

## 2025-02-11 VITALS
BODY MASS INDEX: 26.13 KG/M2 | WEIGHT: 136.47 LBS | HEART RATE: 61 BPM | SYSTOLIC BLOOD PRESSURE: 121 MMHG | RESPIRATION RATE: 16 BRPM | TEMPERATURE: 97.1 F | DIASTOLIC BLOOD PRESSURE: 74 MMHG | OXYGEN SATURATION: 100 %

## 2025-02-11 DIAGNOSIS — Z87.39 STATUS POST HAMMERTOE CORRECTION: Primary | ICD-10-CM

## 2025-02-11 DIAGNOSIS — Z98.890 STATUS POST HAMMERTOE CORRECTION: Primary | ICD-10-CM

## 2025-02-11 PROCEDURE — 73630 X-RAY EXAM OF FOOT: CPT

## 2025-02-11 RX ORDER — HYDROMORPHONE HCL/PF 1 MG/ML
0.5 SYRINGE (ML) INJECTION
Status: DISCONTINUED | OUTPATIENT
Start: 2025-02-11 | End: 2025-02-11 | Stop reason: HOSPADM

## 2025-02-11 RX ORDER — PROPOFOL 10 MG/ML
INJECTION, EMULSION INTRAVENOUS CONTINUOUS PRN
Status: DISCONTINUED | OUTPATIENT
Start: 2025-02-11 | End: 2025-02-11

## 2025-02-11 RX ORDER — FENTANYL CITRATE 50 UG/ML
INJECTION, SOLUTION INTRAMUSCULAR; INTRAVENOUS AS NEEDED
Status: DISCONTINUED | OUTPATIENT
Start: 2025-02-11 | End: 2025-02-11

## 2025-02-11 RX ORDER — MIDAZOLAM HYDROCHLORIDE 2 MG/2ML
INJECTION, SOLUTION INTRAMUSCULAR; INTRAVENOUS AS NEEDED
Status: DISCONTINUED | OUTPATIENT
Start: 2025-02-11 | End: 2025-02-11

## 2025-02-11 RX ORDER — LIDOCAINE HCL/PF 100 MG/5ML
SYRINGE (ML) INJECTION AS NEEDED
Status: DISCONTINUED | OUTPATIENT
Start: 2025-02-11 | End: 2025-02-11

## 2025-02-11 RX ORDER — PHENYLEPHRINE HCL IN 0.9% NACL 1 MG/10 ML
SYRINGE (ML) INTRAVENOUS AS NEEDED
Status: DISCONTINUED | OUTPATIENT
Start: 2025-02-11 | End: 2025-02-11

## 2025-02-11 RX ORDER — BUPIVACAINE HYDROCHLORIDE 5 MG/ML
INJECTION, SOLUTION EPIDURAL; INTRACAUDAL AS NEEDED
Status: DISCONTINUED | OUTPATIENT
Start: 2025-02-11 | End: 2025-02-11 | Stop reason: HOSPADM

## 2025-02-11 RX ORDER — ONDANSETRON 2 MG/ML
INJECTION INTRAMUSCULAR; INTRAVENOUS AS NEEDED
Status: DISCONTINUED | OUTPATIENT
Start: 2025-02-11 | End: 2025-02-11

## 2025-02-11 RX ORDER — SODIUM CHLORIDE, SODIUM LACTATE, POTASSIUM CHLORIDE, CALCIUM CHLORIDE 600; 310; 30; 20 MG/100ML; MG/100ML; MG/100ML; MG/100ML
125 INJECTION, SOLUTION INTRAVENOUS CONTINUOUS
Status: DISCONTINUED | OUTPATIENT
Start: 2025-02-11 | End: 2025-02-11 | Stop reason: HOSPADM

## 2025-02-11 RX ORDER — PROPOFOL 10 MG/ML
INJECTION, EMULSION INTRAVENOUS AS NEEDED
Status: DISCONTINUED | OUTPATIENT
Start: 2025-02-11 | End: 2025-02-11

## 2025-02-11 RX ORDER — CEFAZOLIN SODIUM 1 G/50ML
1000 SOLUTION INTRAVENOUS ONCE
Status: COMPLETED | OUTPATIENT
Start: 2025-02-11 | End: 2025-02-11

## 2025-02-11 RX ORDER — MAGNESIUM HYDROXIDE 1200 MG/15ML
LIQUID ORAL AS NEEDED
Status: DISCONTINUED | OUTPATIENT
Start: 2025-02-11 | End: 2025-02-11 | Stop reason: HOSPADM

## 2025-02-11 RX ORDER — FENTANYL CITRATE/PF 50 MCG/ML
25 SYRINGE (ML) INJECTION
Status: DISCONTINUED | OUTPATIENT
Start: 2025-02-11 | End: 2025-02-11 | Stop reason: HOSPADM

## 2025-02-11 RX ORDER — ONDANSETRON 2 MG/ML
4 INJECTION INTRAMUSCULAR; INTRAVENOUS ONCE AS NEEDED
Status: DISCONTINUED | OUTPATIENT
Start: 2025-02-11 | End: 2025-02-11 | Stop reason: HOSPADM

## 2025-02-11 RX ORDER — OXYCODONE AND ACETAMINOPHEN 5; 325 MG/1; MG/1
1 TABLET ORAL EVERY 4 HOURS PRN
Qty: 10 TABLET | Refills: 0 | Status: SHIPPED | OUTPATIENT
Start: 2025-02-11

## 2025-02-11 RX ADMIN — FENTANYL CITRATE 25 MCG: 50 INJECTION INTRAMUSCULAR; INTRAVENOUS at 08:48

## 2025-02-11 RX ADMIN — PROPOFOL 50 MG: 10 INJECTION, EMULSION INTRAVENOUS at 08:48

## 2025-02-11 RX ADMIN — FENTANYL CITRATE 25 MCG: 50 INJECTION INTRAMUSCULAR; INTRAVENOUS at 08:57

## 2025-02-11 RX ADMIN — ONDANSETRON 4 MG: 2 INJECTION INTRAMUSCULAR; INTRAVENOUS at 08:50

## 2025-02-11 RX ADMIN — LIDOCAINE HYDROCHLORIDE 60 MG: 20 INJECTION INTRAVENOUS at 08:48

## 2025-02-11 RX ADMIN — Medication 100 MCG: at 09:02

## 2025-02-11 RX ADMIN — MIDAZOLAM 2 MG: 1 INJECTION INTRAMUSCULAR; INTRAVENOUS at 08:46

## 2025-02-11 RX ADMIN — PROPOFOL 120 MCG/KG/MIN: 10 INJECTION, EMULSION INTRAVENOUS at 08:48

## 2025-02-11 RX ADMIN — SODIUM CHLORIDE, SODIUM LACTATE, POTASSIUM CHLORIDE, AND CALCIUM CHLORIDE 125 ML/HR: .6; .31; .03; .02 INJECTION, SOLUTION INTRAVENOUS at 07:20

## 2025-02-11 RX ADMIN — SODIUM CHLORIDE, SODIUM LACTATE, POTASSIUM CHLORIDE, AND CALCIUM CHLORIDE: .6; .31; .03; .02 INJECTION, SOLUTION INTRAVENOUS at 09:27

## 2025-02-11 RX ADMIN — Medication 100 MCG: at 09:22

## 2025-02-11 RX ADMIN — CEFAZOLIN SODIUM 1000 MG: 1 SOLUTION INTRAVENOUS at 08:51

## 2025-02-11 RX ADMIN — CEFAZOLIN SODIUM 1000 MG: 1 SOLUTION INTRAVENOUS at 09:02

## 2025-02-11 RX ADMIN — Medication 100 MCG: at 09:09

## 2025-02-11 RX ADMIN — Medication 100 MCG: at 09:15

## 2025-02-11 NOTE — OP NOTE
OPERATIVE REPORT  PATIENT NAME: Yann Blackwell    :  1960  MRN: [id number]  Pt Location: [facility] OR ROOM 02    SURGERY DATE: 2025    Surgeons and Role:     * Cleveland Jaffe DPM - Primary     * Geovanni Rutledge DPM - Assisting    Preop Diagnosis:  Hammer toe of left foot [M20.42]    Post-Op Diagnosis Codes:     * Hammer toe of left foot [M20.42]    Procedure(s):  Left - LEFT 2nd toe hammertoe correction with cutting soft tissue and bone. and removal of bone    Specimen(s):  * No specimens in log *    Estimated Blood Loss:   Minimal    Drains:  * No LDAs found *    Anesthesia Type:   Choice    Operative Indications:  Hammer toe of left foot [M20.42]      Operative Findings:  Consistent with above      Complications:   None    Procedure and Technique:  Patient is brought back to the op room and under light sedation and transferred to the operating table in the supine position.  After anesthesia was administered a preinjection time was completed all parties agreement.  A local block consisting of 5 cc 1% lidocaine 0.5% Marcaine was into the left toe.  Attention was then placed on the patient's left lower extremity with ample Webril padding.  Left lower extremity was prepped and draped in the normal sterile manner.  A preincision timeout was complete with all parties in agreement.  An Esmarch band was utilized to a second weight the left lower extremity and the tourniquet was inflated.    Attention was then directed to the left 2nd toe. Hammertoe deformity was present. A  dorsal linear ellipse incision was made from the metatarsal- phalangeal joint to the proximal interphalangeal joint. The incision was then deepened via sharp dissection through the subcutaneous tissues, ligating all venous vessels as necessary. Dissection was carried to the level of the EDL tendon. The tendon was then transected at that level. The PIPJ was then exposed and the medial and lateral collateral ligaments were incised to expose the  head of the proximal phalanx. By use of sagittal saw, the head of the proximal phalanx was resected on digits 2nd digit. Push test showed that the contracture was still noted at the metatarsal phalangeal joint.  The skin incision was continued proximally.  Blunt dissection was carried down to the metatarsal phalangeal joint.  A soft tissue release was performed.  There is still noted to be bowstringing of the the EDL tendon.  A Z-lengthening of the tendon was performed.  Adequate reduction of the hammertoe deformity was then noted.    The incision was then copiously flushed utilizing sterile saline.  Tendon was reapproximated utilizing 3-0 Vicryl.  Subcuticular closure was achieved lysing 3-0 Vicryl.  Skin closure achieved utilizing 4-0 nylon in a horizontal mattress type fashion.  The foot was then cleansed and dried.  A postoperative injection consisting of 7 cc of 0.25% Marcaine was injected to the foot.  A dressing consisting of Xeroform DSD and Ace was applied to the left foot.  Tourniquet was deflated with immediate hyperemic spots noted to all digits.  Patient tolerated procedure well with no B complications.     Dr. Jaffe  was present for the entire procedure.    Patient Disposition:  PACU              SIGNATURE: Geovanni Rutledge DPM  DATE: February 11, 2025  TIME: 9:38 AM

## 2025-02-11 NOTE — DISCHARGE SUMMARY
Discharge Summary Outpatient Procedure Podiatry -   [facility] SAUL Blackwell 64 y.o. male MRN: [id number]  Unit/Bed#: OR POOL Encounter: [id number]    Admission Date: 2/11/2025     Admitting Diagnosis: Hammer toe of left foot [M20.42]    Discharge Diagnosis: same    Procedures Performed: LEFT 2nd toe hammertoe correction with cutting soft tissue and bone, and removal of bone: 04658 (CPT®)    Complications: none    Condition at Discharge: stable    Discharge instructions/Information to patient and family:   See after visit summary for information provided to patient and family.      Provisions for Follow-Up Care/Important appointments:  See after visit summary for information related to follow-up care and any pertinent home health orders.      Discharge Medications:  See after visit summary for reconciled discharge medications provided to patient and family.

## 2025-02-11 NOTE — ANESTHESIA PREPROCEDURE EVALUATION
Procedure:  LEFT 2nd toe hammertoe correction with cutting soft tissue and bone, and removal of bone (Left: Foot)    Relevant Problems   ANESTHESIA (within normal limits)      CARDIO   (+) Mixed hyperlipidemia      PULMONARY (within normal limits)      Rheumatology   (+) Hammer toe of left foot        Physical Exam    Airway    Mallampati score: I  TM Distance: >3 FB  Neck ROM: full     Dental   No notable dental hx     Cardiovascular  Rhythm: regular, Rate: normal, Cardiovascular exam normal    Pulmonary  Pulmonary exam normal Breath sounds clear to auscultation    Other Findings        Anesthesia Plan  ASA Score- 2     Anesthesia Type- general with ASA Monitors.         Additional Monitors:     Airway Plan: LMA.    Comment: Vs. IV sedation.       Plan Factors-Exercise tolerance (METS): >4 METS.    Chart reviewed.   Existing labs reviewed. Patient summary reviewed.    Patient is not a current smoker.              Induction- intravenous.    Postoperative Plan-         Informed Consent- Anesthetic plan and risks discussed with patient.        NPO Status:  Vitals Value Taken Time   Date of last liquid 02/11/25 02/11/25 0711   Time of last liquid 0545 02/11/25 0711   Date of last solid 02/10/25 02/11/25 0711   Time of last solid 2000 02/11/25 0711

## 2025-02-11 NOTE — ANESTHESIA POSTPROCEDURE EVALUATION
Post-Op Assessment Note    CV Status:  Stable    Pain management: adequate       Mental Status:  Alert and awake   Hydration Status:  Euvolemic   PONV Controlled:  Controlled   Airway Patency:  Patent     Post Op Vitals Reviewed: Yes    No anethesia notable event occurred.    Staff: Anesthesiologist           Last Filed PACU Vitals:  Vitals Value Taken Time   Temp 97.1 °F (36.2 °C) 02/11/25 1100   Pulse 61 02/11/25 1100   /74 02/11/25 1100   Resp 16 02/11/25 1100   SpO2 100 % 02/11/25 1100       Modified Riley:     Vitals Value Taken Time   Activity 2 02/11/25 1020   Respiration 2 02/11/25 1020   Circulation 2 02/11/25 1020   Consciousness 1 02/11/25 1020   Oxygen Saturation 2 02/11/25 1020     Modified Riley Score: 9

## 2025-02-11 NOTE — DISCHARGE INSTR - AVS FIRST PAGE
Dr. Jaffe  Post-Operative Instructions    1. Take your prescribed medication as needed. You can take ibuprofen in between doses of the narcotic if needed.   2. Upon arrival at home, lie down and elevate your surgical foot on 2 pillows.  3. Remain quiet, off your feet as much as possible, for the first 24-48 hours. This is when your feet first swell and may become painful. After 48 hours you may begin limited walking following these restrictions: weight bearing as tolerated in a surgical shoe     4. Drink large quantities of water. Consume no alcohol. Continue a well-balanced diet.  5. Report any unusual discomfort or fever to this office.  6. A limited amount of discomfort and swelling is to be expected. In some cases the skin may take on a bruised appearance. The surgical solution that was applied to your foot prior to the operation is dark in color and the operation site may appear to be oozing when it actually is not.  7. A slight amount of blood is to be expected, and is no cause for alarm. Do not remove the dressings. If there is active bleeding and if the bleeding persists, add additional gauze to the bandage, apply direct pressure, elevate your feet and call this office.  8. Do not get the dressings wet. As regular bathing may be inconvenient, sponge baths are recommended. If you shower, make sure the dressing stays dry.   9. When anesthesia wears off and if any discomfort should be present, apply an ice pack directly over the operated area for 15 minute intervals for several hours or until the pain leaves. (USE IN EXCESS OF 15 MINUTES COULD CAUSE FROSTBITE). Do not use hot water bags or electric pads. A convenient icepack can be made by placing ice cubes in a plastic bag and covering this with a towel.  10. If necessary, take a mild laxative before retiring.  11. Wear your special open shoes anytime you put weight on your foot, even if it is just to walk to the bathroom and back. It will probably be 2 or  3 weeks before you will be permitted to try regular shoes.  12. Having performed the operation, we are interested in a prompt recovery. Please cooperate by following the above instructions.  13. Please call to confirm your post-op appointment or call with any other questions.

## 2025-02-13 ENCOUNTER — TELEPHONE (OUTPATIENT)
Dept: FAMILY MEDICINE CLINIC | Facility: CLINIC | Age: 65
End: 2025-02-13

## 2025-02-13 NOTE — TELEPHONE ENCOUNTER
LVM for patient to let him know his PCP is  Dr. Angela eWinstein    From   Yes, good morning. I'd like to make an appointment with the Doctor Curry for the physical exam. My number is [phone]. Thank you. Bye bye.    Patient can be reached at [phone]

## 2025-04-02 ENCOUNTER — APPOINTMENT (OUTPATIENT)
Age: 65
End: 2025-04-02

## 2025-05-06 ENCOUNTER — OFFICE VISIT (OUTPATIENT)
Dept: OBGYN CLINIC | Facility: CLINIC | Age: 65
End: 2025-05-06
Payer: COMMERCIAL

## 2025-05-06 ENCOUNTER — APPOINTMENT (OUTPATIENT)
Dept: RADIOLOGY | Age: 65
End: 2025-05-06
Attending: STUDENT IN AN ORGANIZED HEALTH CARE EDUCATION/TRAINING PROGRAM
Payer: COMMERCIAL

## 2025-05-06 VITALS — BODY MASS INDEX: 26.9 KG/M2 | HEIGHT: 60 IN | WEIGHT: 137 LBS

## 2025-05-06 DIAGNOSIS — M17.0 PRIMARY OSTEOARTHRITIS OF KNEES, BILATERAL: ICD-10-CM

## 2025-05-06 DIAGNOSIS — M25.561 PAIN IN BOTH KNEES, UNSPECIFIED CHRONICITY: ICD-10-CM

## 2025-05-06 DIAGNOSIS — M25.561 PAIN IN BOTH KNEES, UNSPECIFIED CHRONICITY: Primary | ICD-10-CM

## 2025-05-06 DIAGNOSIS — M25.562 PAIN IN BOTH KNEES, UNSPECIFIED CHRONICITY: Primary | ICD-10-CM

## 2025-05-06 DIAGNOSIS — M25.562 PAIN IN BOTH KNEES, UNSPECIFIED CHRONICITY: ICD-10-CM

## 2025-05-06 PROCEDURE — 73564 X-RAY EXAM KNEE 4 OR MORE: CPT

## 2025-05-06 PROCEDURE — 99204 OFFICE O/P NEW MOD 45 MIN: CPT | Performed by: STUDENT IN AN ORGANIZED HEALTH CARE EDUCATION/TRAINING PROGRAM

## 2025-05-06 PROCEDURE — 20610 DRAIN/INJ JOINT/BURSA W/O US: CPT | Performed by: STUDENT IN AN ORGANIZED HEALTH CARE EDUCATION/TRAINING PROGRAM

## 2025-05-06 RX ADMIN — BETAMETHASONE SODIUM PHOSPHATE AND BETAMETHASONE ACETATE 2 MG: 3; 3 INJECTION, SUSPENSION INTRA-ARTICULAR; INTRALESIONAL; INTRAMUSCULAR; SOFT TISSUE at 15:00

## 2025-05-06 RX ADMIN — ROPIVACAINE HYDROCHLORIDE 2 ML: 5 INJECTION, SOLUTION EPIDURAL; INFILTRATION; PERINEURAL at 15:00

## 2025-05-06 NOTE — PROGRESS NOTES
Date: 25  Yann Blackwell   MRN# [id number]  : 1960      Chief Complaint: Bilateral knee pain     Assessment and Plan:  Assessment & Plan  Primary osteoarthritis of knees, bilateral  -WBAT  -Activity modification to limit strain or impact on the joint  -Tylenol 1000mg up to three times daily as needed. Do not exceed 3000mg daily  -Home exercise program directed by PT  -Knee sleeve or brace for comfort  -Cane or walker recommended to offload joint  -Corticosteroid injection was offered, accepted, and administered.  Risk benefits and alternatives were discussed prior to injection.  Patient tolerated the procedure well.  -Patient may follow up prn for further evaluation and treatment               Subjective:     Knee Pain  Patient complains of bilateral knee pain. Left > Right. This is evaluated as a personal injury. The pain began 1 months ago. The pain is located medial, patellar tendon.  He describes the symptoms as aching and dull. Symptoms improve with rest. The symptoms are worse with activity, getting up from a chair, sitting for prolonged periods of time. The knee has given out or felt unstable. The patient can bend and straighten the knee fully.  The patient is active in none. Treatment to date has been none    External Records Reviewed: historical medical records, office notes, radiology reports, and x-ray reports    Allergy:  No Known Allergies  Medications:  all current active meds have been reviewed  Past Medical History:  Past Medical History:   Diagnosis Date    Annual physical exam 2023    Encounter for screening colonoscopy 2023    Hyperlipidemia      Past Surgical History:  Past Surgical History:   Procedure Laterality Date    COLONOSCOPY      FOOT SURGERY      NC CORRECTION HAMMERTOE Left 2025    Procedure: LEFT 2nd toe hammertoe correction with cutting soft tissue and bone, and removal of bone;  Surgeon: Cleveland Jaffe DPM;  Location: [facility];  Service: Podiatry      Family History:  Family History   Problem Relation Age of Onset    Pancreatic cancer Mother     Liver disease Father         Chronic      Social History:  Social History     Substance and Sexual Activity   Alcohol Use Never     Social History     Substance and Sexual Activity   Drug Use Never     Social History     Tobacco Use   Smoking Status Never   Smokeless Tobacco Never           ROS:   Review of Systems   Constitutional:  Negative for chills and fever.   HENT:  Negative for ear pain and sore throat.    Eyes:  Negative for pain and visual disturbance.   Respiratory:  Negative for cough and shortness of breath.    Cardiovascular:  Negative for chest pain and palpitations.   Gastrointestinal:  Negative for abdominal pain and vomiting.   Genitourinary:  Negative for dysuria and hematuria.   Musculoskeletal:  Negative for arthralgias and back pain.   Skin:  Negative for color change and rash.   Neurological:  Negative for seizures and syncope.   All other systems reviewed and are negative.       Objective:   BP Readings from Last 1 Encounters:   02/11/25 121/74      Wt Readings from Last 1 Encounters:   05/06/25 62.1 kg (137 lb)      Pulse Readings from Last 1 Encounters:   02/11/25 61      BMI: Estimated body mass index is 26.76 kg/m² as calculated from the following:    Height as of this encounter: 5' (1.524 m).    Weight as of this encounter: 62.1 kg (137 lb).        Physical Exam  Constitutional:       General: He is not in acute distress.  HENT:      Head: Normocephalic and atraumatic.   Eyes:      Conjunctiva/sclera: Conjunctivae normal.   Cardiovascular:      Comments: Extremities well perfused   No LE edema    Pulmonary:      Effort: Pulmonary effort is normal.   Abdominal:      General: Abdomen is flat. Bowel sounds are normal.   Neurological:      Mental Status: He is alert. Mental status is at baseline.          Gait and Station:   normal    Bilateral Lower Extremity:  Left Knee:      Inspection:   normal color, temperature, turgor and moisture    Overall limb alignment: varus    Effusion: no    ROM 0 to 120 without pain    Extensor Lag: Absent    Palpation: Medial joint line tenderness to palpation    stable to AP translation at 90 deg    Coronal plane stable in full extension    Coronal plane stable in mid-flexion     Motor: 5/5 EHL/FHL/TA/GS/Qd/Hs    Vascular: Toes WWP with BCR    Sensory: SILT DP/SP/Mark/Saph/Tib    Right knee:   nspection:  normal color, temperature, turgor and moisture    Overall limb alignment: varus    Effusion: no    ROM 0 to 120 without pain    Extensor Lag: Absent    Palpation: Medial joint line tenderness to palpation    stable to AP translation at 90 deg    Coronal plane stable in full extension    Coronal plane stable in mid-flexion     Motor: 5/5 EHL/FHL/TA/GS/Qd/Hs    Vascular: Toes WWP with BCR    Sensory: SILT DP/SP/Amrk/Saph/Tib      Large joint arthrocentesis: R knee    Performed by: José Antonio Montemayor MD  Authorized by: José Antonio Montemayor MD    Universal Protocol:  Consent: Verbal consent obtained.  Risks and benefits: risks, benefits and alternatives were discussed  Consent given by: patient  Patient understanding: patient states understanding of the procedure being performed  Patient identity confirmed: verbally with patient  Supporting Documentation  Indications: pain and joint swelling     Is this a Visco injection? NoProcedure Details  Location: knee - R knee  Needle size: 22 G  Ultrasound guidance: no  Approach: lateral  Medications administered: 2 mg betamethasone acetate-betamethasone sodium phosphate 6 (3-3) mg/mL; 2 mL ropivacaine 0.5 %    Patient tolerance: patient tolerated the procedure well with no immediate complications  Dressing:  Sterile dressing applied      Large joint arthrocentesis: L knee    Performed by: José Antonio Montemayor MD  Authorized by: José Antonio Montemayor MD    Universal Protocol:  Consent: Verbal consent obtained.  Risks and benefits: risks,  benefits and alternatives were discussed  Consent given by: patient  Patient understanding: patient states understanding of the procedure being performed  Patient identity confirmed: verbally with patient  Supporting Documentation  Indications: pain and joint swelling     Is this a Visco injection? NoProcedure Details  Location: knee - L knee  Needle size: 22 G  Ultrasound guidance: no  Approach: lateral  Medications administered: 2 mg betamethasone acetate-betamethasone sodium phosphate 6 (3-3) mg/mL; 2 mL ropivacaine 0.5 %    Patient tolerance: patient tolerated the procedure well with no immediate complications  Dressing:  Sterile dressing applied         Images:    I personally reviewed relevant images in the PACS system and my interpretation is as follows:  X-rays of the bilateral knee reveal severe degenerative changes  to the medial compartment with subchondral sclerosis, joint space narrowing, and osteophyte formation    I have spent a total time of 45 minutes in caring for this patient on the day of the visit/encounter including Diagnostic results, Prognosis, Risks and benefits of tx options, Instructions for management, Patient and family education, Importance of tx compliance, Risk factor reductions, Impressions, Counseling / Coordination of care, Documenting in the medical record, and Obtaining or reviewing history  .      Scribe Attestation      I,:  José Luis Rangel am acting as a scribe while in the presence of the attending physician.:       I,:  José Antonio Montemayor MD personally performed the services described in this documentation    as scribed in my presence.:              José Antonio Montemayor MD  Adult Reconstruction Specialist   [facility]

## 2025-05-07 RX ORDER — ROPIVACAINE HYDROCHLORIDE 5 MG/ML
2 INJECTION, SOLUTION EPIDURAL; INFILTRATION; PERINEURAL
Status: COMPLETED | OUTPATIENT
Start: 2025-05-06 | End: 2025-05-06

## 2025-05-07 RX ORDER — BETAMETHASONE SODIUM PHOSPHATE AND BETAMETHASONE ACETATE 3; 3 MG/ML; MG/ML
2 INJECTION, SUSPENSION INTRA-ARTICULAR; INTRALESIONAL; INTRAMUSCULAR; SOFT TISSUE
Status: COMPLETED | OUTPATIENT
Start: 2025-05-06 | End: 2025-05-06

## 2025-07-22 ENCOUNTER — OFFICE VISIT (OUTPATIENT)
Dept: INTERNAL MEDICINE CLINIC | Facility: CLINIC | Age: 65
End: 2025-07-22
Payer: COMMERCIAL

## 2025-07-22 VITALS
OXYGEN SATURATION: 97 % | SYSTOLIC BLOOD PRESSURE: 138 MMHG | BODY MASS INDEX: 26.95 KG/M2 | DIASTOLIC BLOOD PRESSURE: 88 MMHG | TEMPERATURE: 97.9 F | WEIGHT: 138 LBS | HEART RATE: 94 BPM

## 2025-07-22 DIAGNOSIS — T63.441S BEE STING, ACCIDENTAL OR UNINTENTIONAL, SEQUELA: Primary | ICD-10-CM

## 2025-07-22 PROBLEM — T63.441A BEE STING: Status: ACTIVE | Noted: 2025-07-22

## 2025-07-22 PROCEDURE — 99213 OFFICE O/P EST LOW 20 MIN: CPT | Performed by: INTERNAL MEDICINE

## 2025-07-22 NOTE — PATIENT INSTRUCTIONS
Problem List Items Addressed This Visit          Surgery/Wound/Pain    Bee sting - Primary    Discussed with patient treatment including topical hydrocortisone cream, Benadryl orally, and if Benadryl makes him too drowsy then something like Allegra.  No signs of infection, but patient instructed to call if he is having increased redness and warmth of the area after trying the hydrocortisone cream and the Benadryl.

## 2025-07-22 NOTE — ASSESSMENT & PLAN NOTE
Discussed with patient treatment including topical hydrocortisone cream, Benadryl orally, and if Benadryl makes him too drowsy then something like Allegra.  No signs of infection, but patient instructed to call if he is having increased redness and warmth of the area after trying the hydrocortisone cream and the Benadryl.

## 2025-07-22 NOTE — PROGRESS NOTES
Name: Yann Blackwell      : 1960      MRN: [id number]  Encounter Provider: Selvin Miller MD  Encounter Date: 2025   Encounter department: [facility]  :  Assessment & Plan  Bee sting, accidental or unintentional, sequela  Discussed with patient treatment including topical hydrocortisone cream, Benadryl orally, and if Benadryl makes him too drowsy then something like Allegra.  No signs of infection, but patient instructed to call if he is having increased redness and warmth of the area after trying the hydrocortisone cream and the Benadryl.              History of Present Illness   Patient here for an acute issue.  Patient had a bee sting right forearm 2 days ago.  Patient has been using ice on the area.  No fevers or chills, no tongue or lip swelling or shortness of breath or difficulty swallowing      Review of Systems   Constitutional:  Negative for chills and fever.   HENT:  Negative for trouble swallowing.    Respiratory:  Negative for shortness of breath and wheezing.        Objective   /88 (BP Location: Left arm, Patient Position: Sitting, Cuff Size: Standard)   Pulse 94   Temp 97.9 °F (36.6 °C) (Tympanic)   Wt 62.6 kg (138 lb)   SpO2 97%   BMI 26.95 kg/m²      Physical Exam  Pulmonary:      Effort: Pulmonary effort is normal. No respiratory distress.     Skin:     Comments: Swelling right forearm with mild erythema, no stinger noted in the skin, but the sting spot is visible with magnification

## 2025-07-23 ENCOUNTER — RA CDI HCC (OUTPATIENT)
Dept: OTHER | Facility: HOSPITAL | Age: 65
End: 2025-07-23

## 2025-07-23 PROBLEM — Z01.818 PREOP EXAMINATION: Status: RESOLVED | Noted: 2025-01-21 | Resolved: 2025-07-23

## 2025-08-01 ENCOUNTER — OFFICE VISIT (OUTPATIENT)
Dept: INTERNAL MEDICINE CLINIC | Facility: CLINIC | Age: 65
End: 2025-08-01
Payer: COMMERCIAL

## 2025-08-01 VITALS
TEMPERATURE: 97.2 F | OXYGEN SATURATION: 98 % | HEIGHT: 61 IN | HEART RATE: 74 BPM | DIASTOLIC BLOOD PRESSURE: 82 MMHG | SYSTOLIC BLOOD PRESSURE: 130 MMHG | BODY MASS INDEX: 26.17 KG/M2 | WEIGHT: 138.6 LBS

## 2025-08-01 DIAGNOSIS — Z00.00 ANNUAL PHYSICAL EXAM: ICD-10-CM

## 2025-08-01 DIAGNOSIS — M25.561 CHRONIC PAIN OF BOTH KNEES: ICD-10-CM

## 2025-08-01 DIAGNOSIS — E78.2 MIXED HYPERLIPIDEMIA: Primary | ICD-10-CM

## 2025-08-01 DIAGNOSIS — Z12.5 SCREENING FOR PROSTATE CANCER: ICD-10-CM

## 2025-08-01 DIAGNOSIS — R73.03 PREDIABETES: ICD-10-CM

## 2025-08-01 DIAGNOSIS — M25.562 CHRONIC PAIN OF BOTH KNEES: ICD-10-CM

## 2025-08-01 DIAGNOSIS — E03.8 SUBCLINICAL HYPOTHYROIDISM: ICD-10-CM

## 2025-08-01 DIAGNOSIS — G89.29 CHRONIC PAIN OF BOTH KNEES: ICD-10-CM

## 2025-08-01 DIAGNOSIS — E55.9 VITAMIN D DEFICIENCY: ICD-10-CM

## 2025-08-01 PROCEDURE — 99397 PER PM REEVAL EST PAT 65+ YR: CPT | Performed by: INTERNAL MEDICINE

## 2025-08-01 PROCEDURE — 99214 OFFICE O/P EST MOD 30 MIN: CPT | Performed by: INTERNAL MEDICINE

## 2025-08-03 LAB
25(OH)D3 SERPL-MCNC: 50 NG/ML (ref 30–100)
ALBUMIN SERPL-MCNC: 4.7 G/DL (ref 3.6–5.1)
ALBUMIN/GLOB SERPL: 1.8 (CALC) (ref 1–2.5)
ALP SERPL-CCNC: 65 U/L (ref 35–144)
ALT SERPL-CCNC: 28 U/L (ref 9–46)
AST SERPL-CCNC: 24 U/L (ref 10–35)
BASOPHILS # BLD AUTO: 48 CELLS/UL (ref 0–200)
BASOPHILS NFR BLD AUTO: 0.8 %
BILIRUB SERPL-MCNC: 0.7 MG/DL (ref 0.2–1.2)
BUN SERPL-MCNC: 20 MG/DL (ref 7–25)
BUN/CREAT SERPL: NORMAL (CALC) (ref 6–22)
CALCIUM SERPL-MCNC: 9.7 MG/DL (ref 8.6–10.3)
CHLORIDE SERPL-SCNC: 102 MMOL/L (ref 98–110)
CHOLEST SERPL-MCNC: 205 MG/DL
CHOLEST/HDLC SERPL: 3.7 (CALC)
CO2 SERPL-SCNC: 28 MMOL/L (ref 20–32)
CREAT SERPL-MCNC: 0.92 MG/DL (ref 0.7–1.35)
EOSINOPHIL # BLD AUTO: 108 CELLS/UL (ref 15–500)
EOSINOPHIL NFR BLD AUTO: 1.8 %
ERYTHROCYTE [DISTWIDTH] IN BLOOD BY AUTOMATED COUNT: 13.5 % (ref 11–15)
GFR/BSA.PRED SERPLBLD CYS-BASED-ARV: 92 ML/MIN/1.73M2
GLOBULIN SER CALC-MCNC: 2.6 G/DL (CALC) (ref 1.9–3.7)
GLUCOSE SERPL-MCNC: 97 MG/DL (ref 65–99)
HBA1C MFR BLD: 5.7 %
HCT VFR BLD AUTO: 46 % (ref 38.5–50)
HDLC SERPL-MCNC: 56 MG/DL
HGB BLD-MCNC: 14.8 G/DL (ref 13.2–17.1)
LDLC SERPL CALC-MCNC: 114 MG/DL (CALC)
LYMPHOCYTES # BLD AUTO: 1716 CELLS/UL (ref 850–3900)
LYMPHOCYTES NFR BLD AUTO: 28.6 %
MCH RBC QN AUTO: 31.2 PG (ref 27–33)
MCHC RBC AUTO-ENTMCNC: 32.2 G/DL (ref 32–36)
MCV RBC AUTO: 97 FL (ref 80–100)
MONOCYTES # BLD AUTO: 534 CELLS/UL (ref 200–950)
MONOCYTES NFR BLD AUTO: 8.9 %
NEUTROPHILS # BLD AUTO: 3594 CELLS/UL (ref 1500–7800)
NEUTROPHILS NFR BLD AUTO: 59.9 %
NONHDLC SERPL-MCNC: 149 MG/DL (CALC)
PLATELET # BLD AUTO: 208 THOUSAND/UL (ref 140–400)
PMV BLD REES-ECKER: 9.3 FL (ref 7.5–12.5)
POTASSIUM SERPL-SCNC: 4.6 MMOL/L (ref 3.5–5.3)
PROT SERPL-MCNC: 7.3 G/DL (ref 6.1–8.1)
PSA SERPL-MCNC: 1.77 NG/ML
RBC # BLD AUTO: 4.74 MILLION/UL (ref 4.2–5.8)
SODIUM SERPL-SCNC: 138 MMOL/L (ref 135–146)
T4 FREE SERPL-MCNC: 1.2 NG/DL (ref 0.8–1.8)
TRIGL SERPL-MCNC: 231 MG/DL
TSH SERPL-ACNC: 5.68 MIU/L (ref 0.4–4.5)
WBC # BLD AUTO: 6 THOUSAND/UL (ref 3.8–10.8)

## (undated) DEVICE — SUT MONOCRYL 4-0 PS-2 27 IN Y426H

## (undated) DEVICE — SYRINGE 10ML LL

## (undated) DEVICE — CAST PADDING 4 IN SYNTHETIC NON-STRL

## (undated) DEVICE — CURITY NON-ADHERENT STRIPS: Brand: CURITY

## (undated) DEVICE — BETHLEHEM UNIVERSAL  MIONR EXT: Brand: CARDINAL HEALTH

## (undated) DEVICE — SUT MONOCRYL 3-0 PS-2 18 IN Y497G

## (undated) DEVICE — OCCLUSIVE GAUZE STRIP,3% BISMUTH TRIBROMOPHENATE IN PETROLATUM BLEND: Brand: XEROFORM

## (undated) DEVICE — GLOVE SRG BIOGEL 7.5

## (undated) DEVICE — INTENDED FOR TISSUE SEPARATION, AND OTHER PROCEDURES THAT REQUIRE A SHARP SURGICAL BLADE TO PUNCTURE OR CUT.: Brand: BARD-PARKER ® CARBON RIB-BACK BLADES

## (undated) DEVICE — NEEDLE 18 G X 1 1/2

## (undated) DEVICE — CURITY STRETCH BANDAGE: Brand: CURITY

## (undated) DEVICE — NEEDLE 25G X 1 1/2

## (undated) DEVICE — 10FR FRAZIER SUCTION HANDLE: Brand: CARDINAL HEALTH

## (undated) DEVICE — ACE WRAP 4 IN UNSTERILE

## (undated) DEVICE — PLUMEPEN PRO 10FT

## (undated) DEVICE — GAUZE SPONGES,16 PLY: Brand: CURITY

## (undated) DEVICE — ACE WRAP 6 IN UNSTERILE

## (undated) DEVICE — CHLORAPREP HI-LITE 26ML ORANGE

## (undated) DEVICE — 2000CC GUARDIAN II: Brand: GUARDIAN

## (undated) DEVICE — SUT ETHILON 4-0 PS-2 18 IN 1667H

## (undated) DEVICE — ANTIBACTERIAL UNDYED BRAIDED (POLYGLACTIN 910), SYNTHETIC ABSORBABLE SUTURE: Brand: COATED VICRYL

## (undated) DEVICE — GLOVE INDICATOR PI UNDERGLOVE SZ 8 BLUE

## (undated) DEVICE — SCD SEQUENTIAL COMPRESSION COMFORT SLEEVE MEDIUM KNEE LENGTH: Brand: KENDALL SCD